# Patient Record
Sex: MALE | Race: WHITE | NOT HISPANIC OR LATINO | Employment: FULL TIME | URBAN - METROPOLITAN AREA
[De-identification: names, ages, dates, MRNs, and addresses within clinical notes are randomized per-mention and may not be internally consistent; named-entity substitution may affect disease eponyms.]

---

## 2017-01-09 ENCOUNTER — PATIENT MESSAGE (OUTPATIENT)
Dept: FAMILY MEDICINE | Facility: CLINIC | Age: 57
End: 2017-01-09

## 2017-01-09 DIAGNOSIS — S89.90XA INJURY OF CALF: Primary | ICD-10-CM

## 2017-01-10 NOTE — TELEPHONE ENCOUNTER
I entered the referral to physical therapy because the message was sent to me.     However please remember that if the patient is requesting physical therapy on his/her own without requesting to see me for the injury or without asking for my diagnosis, they simply can contact any physical therapy of their choice and selft refere. That is a new [place] law that went into effect last year.     If the patient has a problem and we recommend therapy or we have to diagnose it first then I'll put in a referral but the patient this time called herself and requested therapy. She could've simply called them to schedule this.

## 2017-01-20 ENCOUNTER — PATIENT OUTREACH (OUTPATIENT)
Dept: ADMINISTRATIVE | Facility: HOSPITAL | Age: 57
End: 2017-01-20

## 2017-01-27 ENCOUNTER — PATIENT MESSAGE (OUTPATIENT)
Dept: ADMINISTRATIVE | Facility: HOSPITAL | Age: 57
End: 2017-01-27

## 2017-02-03 ENCOUNTER — LAB VISIT (OUTPATIENT)
Dept: LAB | Facility: HOSPITAL | Age: 57
End: 2017-02-03
Attending: FAMILY MEDICINE
Payer: OTHER GOVERNMENT

## 2017-02-03 ENCOUNTER — OFFICE VISIT (OUTPATIENT)
Dept: FAMILY MEDICINE | Facility: CLINIC | Age: 57
End: 2017-02-03
Payer: OTHER GOVERNMENT

## 2017-02-03 VITALS
WEIGHT: 200.63 LBS | HEIGHT: 71 IN | BODY MASS INDEX: 28.09 KG/M2 | RESPIRATION RATE: 14 BRPM | HEART RATE: 67 BPM | DIASTOLIC BLOOD PRESSURE: 80 MMHG | SYSTOLIC BLOOD PRESSURE: 130 MMHG

## 2017-02-03 DIAGNOSIS — M79.661 RIGHT CALF PAIN: ICD-10-CM

## 2017-02-03 DIAGNOSIS — S86.001S ACHILLES TENDON INJURY, RIGHT, SEQUELA: Primary | ICD-10-CM

## 2017-02-03 DIAGNOSIS — Z11.59 NEED FOR HEPATITIS C SCREENING TEST: ICD-10-CM

## 2017-02-03 DIAGNOSIS — D48.5 NEOPLASM OF UNCERTAIN BEHAVIOR OF SKIN: ICD-10-CM

## 2017-02-03 DIAGNOSIS — L72.3 SEBACEOUS CYST: ICD-10-CM

## 2017-02-03 PROCEDURE — 36415 COLL VENOUS BLD VENIPUNCTURE: CPT | Mod: PO

## 2017-02-03 PROCEDURE — 99214 OFFICE O/P EST MOD 30 MIN: CPT | Mod: S$PBB,,, | Performed by: FAMILY MEDICINE

## 2017-02-03 PROCEDURE — 99999 PR PBB SHADOW E&M-EST. PATIENT-LVL IV: CPT | Mod: PBBFAC,,, | Performed by: FAMILY MEDICINE

## 2017-02-03 PROCEDURE — 86803 HEPATITIS C AB TEST: CPT

## 2017-02-03 PROCEDURE — 99214 OFFICE O/P EST MOD 30 MIN: CPT | Mod: PBBFAC,PO | Performed by: FAMILY MEDICINE

## 2017-02-03 NOTE — PROGRESS NOTES
Subjective:     THIS DOCUMENT WAS MADE IN PART WITH [employer] DICTATION SOFTWARE.  OCCASIONALLY THIS SOFTWARE WILL MISINTERPRET WORDS OR PHRASES.     Patient ID: Dell Motley is a 56 y.o. male.    Chief Complaint: Muscle Pain (right calf ); Gastroesophageal Reflux; and Mole (and cyst )    HPI     Request derm consult, several lesions arms, raised, hard    Right calf pain x 2-3 months  Was in PT for partial achilles tear, now lower calf pain.  Seems worse after PT/dry needling  [facility]    Elevated BP, white coat.at home he remains satisfactory below 140/90    Occasional reflux, diet dependent.  He said he mentioned this because of his wife's concern.  Not more than once a week.  Prevented by taking Pepcid.    Sebaceous cyst on back, drained years ago but has returned, and did drain at one point but is not no now, no redness or fever    Active Ambulatory Problems     Diagnosis Date Noted    History of colon polyps 05/15/2015     Resolved Ambulatory Problems     Diagnosis Date Noted    No Resolved Ambulatory Problems     Past Medical History   Diagnosis Date    Hypertension     SNHL (sensorineural hearing loss)          Review of Systems   Constitutional: Negative for activity change and unexpected weight change.   HENT: Negative for hearing loss, rhinorrhea and trouble swallowing.    Eyes: Negative for discharge and visual disturbance.   Respiratory: Negative for chest tightness and wheezing.    Cardiovascular: Negative for chest pain and palpitations.   Gastrointestinal: Negative for blood in stool, constipation, diarrhea and vomiting.   Endocrine: Negative for polydipsia and polyuria.   Genitourinary: Negative for difficulty urinating, hematuria and urgency.   Musculoskeletal: Negative for arthralgias and joint swelling.   Neurological: Negative for weakness and headaches.   Psychiatric/Behavioral: Negative for confusion and dysphoric mood.       Objective:      Physical Exam    Constitutional: He is oriented to person, place, and time. He appears well-developed and well-nourished. No distress.   HENT:   Head: Normocephalic and atraumatic.   Eyes: No scleral icterus.   Neck: Normal range of motion. Neck supple.   Cardiovascular: Normal rate, regular rhythm and normal heart sounds.  Exam reveals no gallop.    No murmur heard.  Pulmonary/Chest: Effort normal. No respiratory distress.   Musculoskeletal:   Examination of his right calf reveals no obvious asymmetry compared to the left.  The Achilles appears intact.  Normal strength with plantar flexion of the foot.  Some mild tenderness along the lower calf region and Achilles region.  Negative Homans   Neurological: He is alert and oriented to person, place, and time.   Skin: Skin is warm and dry. He is not diaphoretic.        Psychiatric: He has a normal mood and affect. His behavior is normal.   Vitals reviewed.      Assessment:       1. Achilles tendon injury, right, sequela    2. Right calf pain    3. Neoplasm of uncertain behavior of skin    4. Sebaceous cyst    5. Need for hepatitis C screening test        Plan:       Dell was seen today for muscle pain, gastroesophageal reflux and mole.    Diagnoses and all orders for this visit:    Achilles tendon injury, right, sequela  -     Ambulatory referral to Orthopedics    Right calf pain  -     Ambulatory referral to Orthopedics  -     US Lower Extremity Veins Right; Future  I suspect this is musculoskeletal  With persistent symptoms as a precaution I agree with ordering an ultrasound    Neoplasm of uncertain behavior of skin  Derm consult    Sebaceous cyst  -     Ambulatory referral to Dermatology    Need for hepatitis C screening test  -     Hepatitis C antibody; Future

## 2017-02-06 LAB — HCV AB SERPL QL IA: NEGATIVE

## 2017-02-20 ENCOUNTER — PATIENT MESSAGE (OUTPATIENT)
Dept: FAMILY MEDICINE | Facility: CLINIC | Age: 57
End: 2017-02-20

## 2017-02-22 ENCOUNTER — TELEPHONE (OUTPATIENT)
Dept: FAMILY MEDICINE | Facility: CLINIC | Age: 57
End: 2017-02-22

## 2017-02-22 NOTE — TELEPHONE ENCOUNTER
Spoke to pt. Pt requesting status of referral. Advised pt that it was still pending and that I would ask the referral team to work on it. Pt verbalized understanding.

## 2017-02-22 NOTE — TELEPHONE ENCOUNTER
----- Message from Radha Lujan sent at 2/22/2017  9:41 AM CST -----  Contact: self   Patient need referral sent to [facility]

## 2017-03-03 ENCOUNTER — PATIENT MESSAGE (OUTPATIENT)
Dept: FAMILY MEDICINE | Facility: CLINIC | Age: 57
End: 2017-03-03

## 2017-03-08 ENCOUNTER — HOSPITAL ENCOUNTER (OUTPATIENT)
Dept: RADIOLOGY | Facility: HOSPITAL | Age: 57
Discharge: HOME | End: 2017-03-08
Attending: FAMILY MEDICINE
Payer: OTHER GOVERNMENT

## 2017-03-08 DIAGNOSIS — M79.661 RIGHT CALF PAIN: ICD-10-CM

## 2017-03-08 PROCEDURE — 93971 EXTREMITY STUDY: CPT | Mod: TC,PO

## 2017-03-08 PROCEDURE — 93971 EXTREMITY STUDY: CPT | Mod: 26,,, | Performed by: RADIOLOGY

## 2017-05-02 ENCOUNTER — INITIAL CONSULT (OUTPATIENT)
Dept: DERMATOLOGY | Facility: CLINIC | Age: 57
End: 2017-05-02
Payer: OTHER GOVERNMENT

## 2017-05-02 VITALS — HEIGHT: 71 IN | BODY MASS INDEX: 28 KG/M2 | WEIGHT: 200 LBS | RESPIRATION RATE: 16 BRPM

## 2017-05-02 DIAGNOSIS — L57.0 MULTIPLE ACTINIC KERATOSES: Primary | ICD-10-CM

## 2017-05-02 DIAGNOSIS — L72.9 CYST OF SKIN: ICD-10-CM

## 2017-05-02 DIAGNOSIS — L82.1 SEBORRHEIC KERATOSES: ICD-10-CM

## 2017-05-02 DIAGNOSIS — Z12.83 SKIN CANCER SCREENING: ICD-10-CM

## 2017-05-02 DIAGNOSIS — B07.9 VIRAL WARTS, UNSPECIFIED TYPE: ICD-10-CM

## 2017-05-02 DIAGNOSIS — L81.4 LENTIGINES: ICD-10-CM

## 2017-05-02 PROCEDURE — 99999 PR PBB SHADOW E&M-EST. PATIENT-LVL II: CPT | Mod: PBBFAC,,, | Performed by: DERMATOLOGY

## 2017-05-02 PROCEDURE — 99203 OFFICE O/P NEW LOW 30 MIN: CPT | Mod: 25,S$PBB,, | Performed by: DERMATOLOGY

## 2017-05-02 PROCEDURE — 17110 DESTRUCTION B9 LES UP TO 14: CPT | Mod: PBBFAC,PO | Performed by: DERMATOLOGY

## 2017-05-02 PROCEDURE — 99212 OFFICE O/P EST SF 10 MIN: CPT | Mod: PBBFAC,25,PO | Performed by: DERMATOLOGY

## 2017-05-02 PROCEDURE — 17110 DESTRUCTION B9 LES UP TO 14: CPT | Mod: S$PBB,,, | Performed by: DERMATOLOGY

## 2017-05-02 NOTE — PROGRESS NOTES
Subjective:       Patient ID:  Dell Motley is a 56 y.o. male who presents for   Chief Complaint   Patient presents with    Skin Check    Cyst     HPI Comments: Initial visit with dermatologist for skin check   Bump on R upper back - approx 9 yrs - removed in 2012 & has returned - sebaceous cyst  Lesions - arms  dark in color - months - never treated  Lesion L arm  - yrs - raised, dry & scaly - cryo'd in past by Dr Clemente     No phx skin ca   No known fhx skin ca    Cyst       Growth on left leg, years, desires removal . History cryo in the past.     Review of Systems   Skin: Positive for dry skin, activity-related sunscreen use and wears hat. Negative for itching, daily sunscreen use, sensitivity to antibiotic ointment, sensitivity to bandage adhesive and tendency to form keloidal scars.   Hematologic/Lymphatic: Does not bruise/bleed easily.        Objective:    Physical Exam   Constitutional: He appears well-developed and well-nourished. No distress.   Eyes: Lids are normal.  No conjunctival no injection.   Cardiovascular: There is no local extremity swelling and no dependent edema.     Neurological: He is alert and oriented to person, place, and time. He is not disoriented.   Psychiatric: He has a normal mood and affect.   Skin:   Areas Examined (abnormalities noted in diagram):   Scalp / Hair Palpated and Inspected  Head / Face Inspection Performed  Neck Inspection Performed  Chest / Axilla Inspection Performed  Abdomen Inspection Performed  Back Inspection Performed  RUE Inspected  LUE Inspection Performed  RLE Inspected  LLE Inspection Performed                   Diagram Legend     Erythematous scaling macule/papule c/w actinic keratosis       Vascular papule c/w angioma      Pigmented verrucoid papule/plaque c/w seborrheic keratosis      Yellow umbilicated papule c/w sebaceous hyperplasia      Irregularly shaped tan macule c/w lentigo     1-2 mm smooth white papules consistent with Milia       Movable subcutaneous cyst with punctum c/w epidermal inclusion cyst      Subcutaneous movable cyst c/w pilar cyst      Firm pink to brown papule c/w dermatofibroma      Pedunculated fleshy papule(s) c/w skin tag(s)      Evenly pigmented macule c/w junctional nevus     Mildly variegated pigmented, slightly irregular-bordered macule c/w mildly atypical nevus      Flesh colored to evenly pigmented papule c/w intradermal nevus       Pink pearly papule/plaque c/w basal cell carcinoma      Erythematous hyperkeratotic cursted plaque c/w SCC      Surgical scar with no sign of skin cancer recurrence      Open and closed comedones      Inflammatory papules and pustules      Verrucoid papule consistent consistent with wart     Erythematous eczematous patches and plaques     Dystrophic onycholytic nail with subungual debris c/w onychomycosis     Umbilicated papule    Erythematous-base heme-crusted tan verrucoid plaque consistent with inflamed seborrheic keratosis     Erythematous Silvery Scaling Plaque c/w Psoriasis     See annotation      Assessment / Plan:        Multiple actinic keratoses  Declines cryo today due to upcoming meeting and hunting trip  Will cryo at time of cyst excision.     Cyst of skin, back  Will schedule excision in near future    Skin cancer screening  Upper body skin examination performed today including at least 6 points as noted in physical examination. No lesions suspicious for malignancy noted.        Seborrheic keratoses, trunk and extremities  These are benign inherited growths without a malignant potential. Reassurance given to patient. No treatment is necessary.       Lentigines, trunk  This is a benign hyperpigmented sun induced lesion. Daily sun protection will reduce the number of new lesions. Treatment of these benign lesions are considered cosmetic.    Verruca vulgaris  Cryosurgery procedure note:    Verbal consent from the patient is obtained. Liquid nitrogen cryosurgery is applied to 1  lesions to produce a freeze injury. The patient is aware that blisters may form and is instructed on wound care with gentle cleansing and use of vaseline ointment to keep moist until healed. The patient is supplied a handout on cryosurgery and is instructed to call if lesions do not completely resolve. Risk of dyspigmentation discussed.                Return for cyst excision.

## 2017-05-11 ENCOUNTER — PROCEDURE VISIT (OUTPATIENT)
Dept: DERMATOLOGY | Facility: CLINIC | Age: 57
End: 2017-05-11
Payer: OTHER GOVERNMENT

## 2017-05-11 VITALS — BODY MASS INDEX: 27.86 KG/M2 | WEIGHT: 199 LBS | HEIGHT: 71 IN

## 2017-05-11 DIAGNOSIS — L72.9 CYST OF SKIN: Primary | ICD-10-CM

## 2017-05-11 DIAGNOSIS — L57.0 ACTINIC KERATOSES: ICD-10-CM

## 2017-05-11 DIAGNOSIS — Z98.890 S/P SKIN AND SUBCUTANEOUS TISSUE SURGERY: ICD-10-CM

## 2017-05-11 PROCEDURE — 12031 INTMD RPR S/A/T/EXT 2.5 CM/<: CPT | Mod: 78,S$PBB,, | Performed by: DERMATOLOGY

## 2017-05-11 PROCEDURE — 11402 EXC TR-EXT B9+MARG 1.1-2 CM: CPT | Mod: 78,51,S$PBB, | Performed by: DERMATOLOGY

## 2017-05-11 PROCEDURE — 11402 EXC TR-EXT B9+MARG 1.1-2 CM: CPT | Mod: PBBFAC,PO | Performed by: DERMATOLOGY

## 2017-05-11 PROCEDURE — 88304 TISSUE EXAM BY PATHOLOGIST: CPT | Performed by: PATHOLOGY

## 2017-05-11 PROCEDURE — 12031 INTMD RPR S/A/T/EXT 2.5 CM/<: CPT | Mod: PBBFAC,PO | Performed by: DERMATOLOGY

## 2017-05-11 PROCEDURE — 99499 UNLISTED E&M SERVICE: CPT | Mod: S$PBB,,, | Performed by: DERMATOLOGY

## 2017-05-11 RX ORDER — DOXYCYCLINE 100 MG/1
CAPSULE ORAL
Qty: 20 CAPSULE | Refills: 0 | Status: SHIPPED | OUTPATIENT
Start: 2017-05-11 | End: 2017-06-27 | Stop reason: CLARIF

## 2017-05-11 NOTE — PROGRESS NOTES
Subjective:       Patient ID:  Dell Motley is a 56 y.o. male who presents for No chief complaint on file.    HPI     Pt here for excision of cyst on right upper back    Also with Aks on face, declined cryo at last visit and would like done today.     Last time cyst was excised pt developed a post op infection.     Review of Systems   Skin: Negative for tendency to form keloidal scars.   Hematologic/Lymphatic: Does not bruise/bleed easily.        Objective:    Physical Exam   Constitutional: He appears well-developed and well-nourished. No distress.   HENT:   Head:       Neurological: He is alert and oriented to person, place, and time. He is not disoriented.   Psychiatric: He has a normal mood and affect.   Skin:   Areas Examined (abnormalities noted in diagram):   Head / Face Inspection Performed  Neck Inspection Performed  Chest / Axilla Inspection Performed  Back Inspection Performed  RUE Inspected  LUE Inspection Performed              Diagram Legend     Erythematous scaling macule/papule c/w actinic keratosis       Vascular papule c/w angioma      Pigmented verrucoid papule/plaque c/w seborrheic keratosis      Yellow umbilicated papule c/w sebaceous hyperplasia      Irregularly shaped tan macule c/w lentigo     1-2 mm smooth white papules consistent with Milia      Movable subcutaneous cyst with punctum c/w epidermal inclusion cyst      Subcutaneous movable cyst c/w pilar cyst      Firm pink to brown papule c/w dermatofibroma      Pedunculated fleshy papule(s) c/w skin tag(s)      Evenly pigmented macule c/w junctional nevus     Mildly variegated pigmented, slightly irregular-bordered macule c/w mildly atypical nevus      Flesh colored to evenly pigmented papule c/w intradermal nevus       Pink pearly papule/plaque c/w basal cell carcinoma      Erythematous hyperkeratotic cursted plaque c/w SCC      Surgical scar with no sign of skin cancer recurrence      Open and closed comedones      Inflammatory  papules and pustules      Verrucoid papule consistent consistent with wart     Erythematous eczematous patches and plaques     Dystrophic onycholytic nail with subungual debris c/w onychomycosis     Umbilicated papule    Erythematous-base heme-crusted tan verrucoid plaque consistent with inflamed seborrheic keratosis     Erythematous Silvery Scaling Plaque c/w Psoriasis     See annotation      Assessment / Plan:      Pathology Orders:      Normal Orders This Visit    Tissue Specimen To Pathology, Dermatology     Questions:    Directional Terms:  Other(comment)    Clinical information:  cyst    Specific Site:  right upper back        Cyst of skin  -     Tissue Specimen To Pathology, Dermatology    PROCEDURE: Elliptical excision with intermediate layered repair in order to decrease dead space and decrease tension.    ANESTHETIC: 12 cc 1% Xylocaine with Epinephrine 1:100,000, buffered    SURGEON: France Ford MD      ASSISTANTS: Yamile Denny MA      PREOPERATIVE DIAGNOSIS:  EIC    POSTOPERATIVE DIAGNOSIS:  Same as preoperative diagnosis    PATHOLOGIC DIAGNOSIS: Pending    LOCATION: right upper back    INITIAL LESION SIZE: 2 cm    EXCISED DIAMETER: 2 cm    PREPARATION: The diagnosis, procedure, alternatives, benefits and risks, including but not limited to: infection, bleeding/bruising, drug reactions, pain, scar or cosmetic defect, local sensation disturbances, wound dehiscence (separation of wound edges after sutures removed) and/or recurrence of present condition were explained to the patient. The patient elected to proceed.  Patient's identity was verified using 2 patient identifiers and the side and site was verified.  Time out period with surgeon, assistant and patient in surgical suite was taken.    PROCEDURE: The location noted above was prepped and draped by Yamile Denny MA in the usual sterile fashion. The area was anesthetized by myself. Lesional tissue was carefully marked and a fusiform  elliptical excision was done with #15 blade carried down completely through the dermis into the deep subcutaneous tissues to the level of the non-muscle fascia, and dissection was carried out in that plane.  Electrocoagulation was used to obtain hemostasis. Blood loss was minimal. The wound was then approximated in a layered fashion with subcutaneous and intradermal sutures of 4.0 Monocryl, approximately 3 in number, and the wound was then superficially closed with simple interrupted sutures of 4.0 Nylon.     The patient tolerated the procedure well.    The area was cleaned and dressed appropriately and the patient was given wound care instructions, as well as an appointment for follow-up evaluation.    LENGTH OF REPAIR: 2.3 cm        Actinic keratoses  Cryosurgery Procedure Note    Verbal consent from the patient is obtained and the patient is aware of the precancerous quality and need for treatment of these lesions. Liquid nitrogen cryosurgery is applied to the 3 actinic keratoses, as detailed in the physical exam, to produce a freeze injury. The patient is aware that blisters may form and is instructed on wound care with gentle cleansing and use of vaseline ointment to keep moist until healed. The patient is supplied a handout on cryosurgery and is instructed to call if lesions do not completely resolve. Discussed risk postinflammatory pigmentary changes.       S/P skin and subcutaneous tissue surgery  History skin infection following surgery  Informed patient I will be out of town next week. Rx Abx given to patient today. Does not need to take unless develops warmth, drainage, redness. If not improved with doxy rec go to ED or urgent care  -     doxycycline (MONODOX) 100 MG capsule; 1 po bid with food  Dispense: 20 capsule; Refill: 0             Return in about 2 weeks (around 5/25/2017) for nurse visit for suture removal.

## 2017-05-25 ENCOUNTER — CLINICAL SUPPORT (OUTPATIENT)
Dept: DERMATOLOGY | Facility: CLINIC | Age: 57
End: 2017-05-25
Payer: OTHER GOVERNMENT

## 2017-05-25 ENCOUNTER — OFFICE VISIT (OUTPATIENT)
Dept: DERMATOLOGY | Facility: CLINIC | Age: 57
End: 2017-05-25
Payer: OTHER GOVERNMENT

## 2017-05-25 VITALS — BODY MASS INDEX: 27.86 KG/M2 | HEIGHT: 71 IN | WEIGHT: 199 LBS

## 2017-05-25 DIAGNOSIS — L91.8 INFLAMED SKIN TAG: Primary | ICD-10-CM

## 2017-05-25 DIAGNOSIS — Z87.2 HISTORY OF ACTINIC KERATOSES: ICD-10-CM

## 2017-05-25 PROCEDURE — 99999 PR PBB SHADOW E&M-EST. PATIENT-LVL II: CPT | Mod: PBBFAC,,, | Performed by: DERMATOLOGY

## 2017-05-25 PROCEDURE — 11200 RMVL SKIN TAGS UP TO&INC 15: CPT | Mod: PBBFAC,PO | Performed by: DERMATOLOGY

## 2017-05-25 PROCEDURE — 99211 OFF/OP EST MAY X REQ PHY/QHP: CPT | Mod: PBBFAC,PO,25

## 2017-05-25 PROCEDURE — 99999 PR PBB SHADOW E&M-EST. PATIENT-LVL I: CPT | Mod: PBBFAC,,,

## 2017-05-25 PROCEDURE — 11200 RMVL SKIN TAGS UP TO&INC 15: CPT | Mod: S$PBB,,, | Performed by: DERMATOLOGY

## 2017-05-25 PROCEDURE — 99212 OFFICE O/P EST SF 10 MIN: CPT | Mod: PBBFAC,27,PO,25 | Performed by: DERMATOLOGY

## 2017-05-25 PROCEDURE — 99212 OFFICE O/P EST SF 10 MIN: CPT | Mod: 25,S$PBB,, | Performed by: DERMATOLOGY

## 2017-05-25 NOTE — PROGRESS NOTES
Subjective:       Patient ID:  Dell Motley is a 56 y.o. male who presents for   Chief Complaint   Patient presents with    Lesion     Pt here complains of lesion right axilla for many years, recently became irritated after hitting it with deodorant, not treated  No phx skin ca   No known fhx skin ca  Cyst recently excised from upper back- no post op complaints  AKs s/p cryo at procedure appt, resolved.                     Review of Systems   Skin: Positive for activity-related sunscreen use. Negative for rash and recent sunburn.   Hematologic/Lymphatic: Does not bruise/bleed easily.        Objective:    Physical Exam   Constitutional: He appears well-developed and well-nourished. No distress.   HENT:   Mouth/Throat: Lips normal.    Eyes: Lids are normal.  No conjunctival no injection.   Neurological: He is alert and oriented to person, place, and time. He is not disoriented.   Psychiatric: He has a normal mood and affect.   Skin:   Areas Examined (abnormalities noted in diagram):   Head / Face Inspection Performed  Neck Inspection Performed  Chest / Axilla Inspection Performed  Back Inspection Performed  RUE Inspected  LUE Inspection Performed                   Diagram Legend     Erythematous scaling macule/papule c/w actinic keratosis       Vascular papule c/w angioma      Pigmented verrucoid papule/plaque c/w seborrheic keratosis      Yellow umbilicated papule c/w sebaceous hyperplasia      Irregularly shaped tan macule c/w lentigo     1-2 mm smooth white papules consistent with Milia      Movable subcutaneous cyst with punctum c/w epidermal inclusion cyst      Subcutaneous movable cyst c/w pilar cyst      Firm pink to brown papule c/w dermatofibroma      Pedunculated fleshy papule(s) c/w skin tag(s)      Evenly pigmented macule c/w junctional nevus     Mildly variegated pigmented, slightly irregular-bordered macule c/w mildly atypical nevus      Flesh colored to evenly pigmented papule c/w intradermal nevus        Pink pearly papule/plaque c/w basal cell carcinoma      Erythematous hyperkeratotic cursted plaque c/w SCC      Surgical scar with no sign of skin cancer recurrence      Open and closed comedones      Inflammatory papules and pustules      Verrucoid papule consistent consistent with wart     Erythematous eczematous patches and plaques     Dystrophic onycholytic nail with subungual debris c/w onychomycosis     Umbilicated papule    Erythematous-base heme-crusted tan verrucoid plaque consistent with inflamed seborrheic keratosis     Erythematous Silvery Scaling Plaque c/w Psoriasis     See annotation      Assessment / Plan:        Inflamed skin tag, right axilla  Verbal consent obtained. 1 lesions removed with scissor snip removal after anesthesia with 1% lidocaine with epinephrine. Hemostasis achieved with aluminum chloride and hyfrecation. No complications.      History of actinic keratoses, nose  Resolved s/p cryo  Reassurance              Return in about 6 months (around 11/25/2017).

## 2017-05-25 NOTE — PROGRESS NOTES
Sutures removed from upper back  No sign of infection   Healing wound intact  Sutures removed without difficulty   No pt concerns.  FINAL PATHOLOGIC DIAGNOSIS   1. Skin, right upper back, excision:   - FOLLICULAR CYST, INFUNDIBULAR TYPE.   MICROSCOPIC DESCRIPTION: Sections show a cyst lined by keratinizing squamous epithelium showing a visible   granular layer and filled with loose laminated keratin.   Diagnosed by: Arianna Augustine M.D.   (Electronically Signed: 2017-05-17 16:30:03)   Gross Description   AP/ID [id number]   Received in formalin, labeled RT upper back, is a 1.2 x 0.7 centimeter ellipse of non-pigmented skin with attached   1.7 centimeter rupture cyst. The cyst wall is smooth and white. The skin surface is unremarkable. No orientation   is provided. The rest of the cut surfaces are unremarkable. A representative section is submitted, [id number].   Sue Mcleod   Page

## 2017-06-20 ENCOUNTER — PATIENT MESSAGE (OUTPATIENT)
Dept: FAMILY MEDICINE | Facility: CLINIC | Age: 57
End: 2017-06-20

## 2017-06-20 DIAGNOSIS — I25.10 CORONARY ARTERY DISEASE, ANGINA PRESENCE UNSPECIFIED, UNSPECIFIED VESSEL OR LESION TYPE, UNSPECIFIED WHETHER NATIVE OR TRANSPLANTED HEART: Primary | ICD-10-CM

## 2017-06-20 NOTE — TELEPHONE ENCOUNTER
I responded to his mitral message.  I entered a referral to cardiology.  Please help arrange this follow-up I suspect within the next few weeks.  If his discharge instructions require him to be seen sooner than please discuss that with him.    At this time I'm refraining from sending any prescriptions or refills, will need to see what his discharge medications are and then I'll refill as needed.

## 2017-06-22 NOTE — TELEPHONE ENCOUNTER
Spoke with patient and he stated that he would like to research the cardiologist here at [facility] and he will call back to schedule appointment.

## 2017-06-23 ENCOUNTER — TELEPHONE (OUTPATIENT)
Dept: CARDIOLOGY | Facility: CLINIC | Age: 57
End: 2017-06-23

## 2017-06-23 NOTE — TELEPHONE ENCOUNTER
Spoke with patient and he does not want to wait to see Dr Morris in August. Had a heart attack while at son's house in [place], [place] and needs post angiogram/stent follow up and establishment of care. Opted to see Dr Piedra on Tues. appt set.

## 2017-06-23 NOTE — TELEPHONE ENCOUNTER
----- Message from Jackelyn Sánchez sent at 6/23/2017  7:14 AM CDT -----  Please call pt at [phone]  If any sooner appointments become available ... Has an august appointment / on the wait list ..requested to have his paperwork faxed

## 2017-06-27 ENCOUNTER — OFFICE VISIT (OUTPATIENT)
Dept: CARDIOLOGY | Facility: CLINIC | Age: 57
End: 2017-06-27
Payer: OTHER GOVERNMENT

## 2017-06-27 VITALS
SYSTOLIC BLOOD PRESSURE: 141 MMHG | WEIGHT: 190.25 LBS | DIASTOLIC BLOOD PRESSURE: 84 MMHG | HEIGHT: 71 IN | HEART RATE: 72 BPM | BODY MASS INDEX: 26.64 KG/M2

## 2017-06-27 DIAGNOSIS — I25.10 CORONARY ARTERY DISEASE INVOLVING NATIVE CORONARY ARTERY OF NATIVE HEART WITHOUT ANGINA PECTORIS: ICD-10-CM

## 2017-06-27 DIAGNOSIS — E78.5 DYSLIPIDEMIA: ICD-10-CM

## 2017-06-27 DIAGNOSIS — Z95.5 S/P CORONARY ARTERY STENT PLACEMENT: ICD-10-CM

## 2017-06-27 DIAGNOSIS — Z82.49 FAMILY HISTORY OF CORONARY ARTERY DISEASE: ICD-10-CM

## 2017-06-27 DIAGNOSIS — I25.10 CORONARY ARTERY DISEASE, ANGINA PRESENCE UNSPECIFIED, UNSPECIFIED VESSEL OR LESION TYPE, UNSPECIFIED WHETHER NATIVE OR TRANSPLANTED HEART: Primary | ICD-10-CM

## 2017-06-27 PROCEDURE — 99204 OFFICE O/P NEW MOD 45 MIN: CPT | Mod: S$PBB,,, | Performed by: INTERNAL MEDICINE

## 2017-06-27 PROCEDURE — 99999 PR PBB SHADOW E&M-EST. PATIENT-LVL III: CPT | Mod: PBBFAC,,, | Performed by: INTERNAL MEDICINE

## 2017-06-27 PROCEDURE — 99213 OFFICE O/P EST LOW 20 MIN: CPT | Mod: PBBFAC,PO | Performed by: INTERNAL MEDICINE

## 2017-06-27 RX ORDER — ATORVASTATIN CALCIUM 80 MG/1
80 TABLET, FILM COATED ORAL DAILY
COMMUNITY
End: 2017-06-28 | Stop reason: SDUPTHER

## 2017-06-27 RX ORDER — LISINOPRIL 2.5 MG/1
2.5 TABLET ORAL DAILY
COMMUNITY
End: 2017-06-28 | Stop reason: SDUPTHER

## 2017-06-27 NOTE — LETTER
June 27, 2017      Forrest Clemente MD  [address] [facility] [address] [place] [address]           [facility]  [address] [facility] [address] [place] [address]  Phone: [phone]          Patient: Dell Motley   MR Number: [id number]   YOB: 1960   Date of Visit: 6/27/2017       Dear Dr. Forrest Clemente:    Thank you for referring Dell Motley to me for evaluation. Attached you will find relevant portions of my assessment and plan of care.    If you have questions, please do not hesitate to call me. I look forward to following Dell Motley along with you.    Sincerely,    Jose Miguel Piedra MD    Enclosure  CC:  No Recipients    If you would like to receive this communication electronically, please contact [email or web address] or [phone] to request more information on [employer] access.    For providers and/or their staff who would like to refer a patient to [facility], please contact us through our one-stop-shop provider referral line, [facility], at [phone].    If you feel you have received this communication in error or would no longer like to receive these types of communications, please e-mail [email or web address]

## 2017-06-27 NOTE — PROGRESS NOTES
Subjective:    Patient ID:  Dell Motley is a 56 y.o. male who presents for evaluation of Coronary Artery Disease (establish care Stents in [place]) and Hyperlipidemia      Pt here to establish care. He had MI with stenting of a totally occluded ramus 06/18 while visiting in [place]. Prior to that he had no symptoms and exercised regularly. He had an elevated CT Ca++ score in 2002 and had unremarkable coronary angiogram in 2003. No post discharge issues or recurrent chest pain.         Review of Systems   Constitution: Negative for weight gain and weight loss.   HENT: Negative.    Eyes: Negative.    Cardiovascular: Negative for chest pain, claudication, cyanosis, dyspnea on exertion, irregular heartbeat, leg swelling, near-syncope, orthopnea (no PND), palpitations and syncope.   Respiratory: Negative for cough, hemoptysis, shortness of breath and snoring.    Endocrine: Negative.    Skin: Negative.    Musculoskeletal: Negative for joint pain, muscle cramps, muscle weakness and myalgias.   Gastrointestinal: Negative for diarrhea, hematemesis, nausea and vomiting.   Genitourinary: Negative.    Neurological: Negative for dizziness, focal weakness, light-headedness, loss of balance, numbness, paresthesias and seizures.   Psychiatric/Behavioral: Negative.         Objective:    Physical Exam   Constitutional: He is oriented to person, place, and time. He appears well-developed and well-nourished.   HENT:   Mouth/Throat: Oropharynx is clear and moist.   Eyes: Pupils are equal, round, and reactive to light.   Neck: Normal range of motion. No thyromegaly present.   Cardiovascular: Normal rate, regular rhythm, S1 normal, S2 normal, normal heart sounds, intact distal pulses and normal pulses.   No extrasystoles are present. PMI is not displaced.  Exam reveals no friction rub.    No murmur heard.  Pulmonary/Chest: Effort normal and breath sounds normal. He has no wheezes. He has no rales. He exhibits no tenderness.    Abdominal: Soft. Bowel sounds are normal. He exhibits no distension and no mass. There is no tenderness.   Musculoskeletal: Normal range of motion. He exhibits no edema.   Neurological: He is alert and oriented to person, place, and time.   Skin: Skin is warm and dry.   Vitals reviewed.      Test(s) Reviewed  I have reviewed the following in detail:  [] Stress test   [x] Angiography   [x] Echocardiogram   [x] Labs   [x] Other:  Reviewed all hospital records       Assessment:       1. Coronary artery disease involving native coronary artery of native heart without angina pectoris    2. S/P coronary artery stent placement - x2 Ramus 06/2017; mild luminal disease other vessels    3. Dyslipidemia    4. Family history of coronary artery disease         Plan:       We discussed his event, risk factors and current medications  All questions answered  Enroll in cardiac rehab  Continue present Rx  F/u 3 months with repeat Echo, Lipids, CMP

## 2017-06-28 ENCOUNTER — OFFICE VISIT (OUTPATIENT)
Dept: FAMILY MEDICINE | Facility: CLINIC | Age: 57
End: 2017-06-28
Payer: OTHER GOVERNMENT

## 2017-06-28 VITALS
WEIGHT: 190.25 LBS | SYSTOLIC BLOOD PRESSURE: 124 MMHG | DIASTOLIC BLOOD PRESSURE: 82 MMHG | BODY MASS INDEX: 26.64 KG/M2 | RESPIRATION RATE: 16 BRPM | HEIGHT: 71 IN | HEART RATE: 64 BPM

## 2017-06-28 DIAGNOSIS — I25.10 CORONARY ARTERY DISEASE, ANGINA PRESENCE UNSPECIFIED, UNSPECIFIED VESSEL OR LESION TYPE, UNSPECIFIED WHETHER NATIVE OR TRANSPLANTED HEART: Primary | ICD-10-CM

## 2017-06-28 DIAGNOSIS — R73.09 ELEVATED GLUCOSE: ICD-10-CM

## 2017-06-28 PROCEDURE — 99999 PR PBB SHADOW E&M-EST. PATIENT-LVL III: CPT | Mod: PBBFAC,,, | Performed by: FAMILY MEDICINE

## 2017-06-28 PROCEDURE — 99214 OFFICE O/P EST MOD 30 MIN: CPT | Mod: S$PBB,,, | Performed by: FAMILY MEDICINE

## 2017-06-28 PROCEDURE — 99213 OFFICE O/P EST LOW 20 MIN: CPT | Mod: PBBFAC,PO | Performed by: FAMILY MEDICINE

## 2017-06-28 RX ORDER — LISINOPRIL 2.5 MG/1
2.5 TABLET ORAL DAILY
Qty: 90 TABLET | Refills: 1 | Status: SHIPPED | OUTPATIENT
Start: 2017-06-28 | End: 2017-07-12 | Stop reason: SDUPTHER

## 2017-06-28 RX ORDER — ATORVASTATIN CALCIUM 80 MG/1
80 TABLET, FILM COATED ORAL DAILY
Qty: 30 TABLET | Refills: 5 | Status: SHIPPED | OUTPATIENT
Start: 2017-06-28 | End: 2017-07-12 | Stop reason: SDUPTHER

## 2017-06-29 NOTE — PROGRESS NOTES
Subjective:     THIS DOCUMENT WAS MADE IN PART WITH [employer] DICTATION SOFTWARE.  OCCASIONALLY THIS SOFTWARE WILL MISINTERPRET WORDS OR PHRASES.     Patient ID: Dell Motley is a 56 y.o. male.    Chief Complaint: Hospital Follow Up (Post MI )    HPI      hospital follow-up. This is a 56-year-old male who recently had an acute MI. He was on vacation with his family, sitting on the couch at the time when he felt some discomfort in his chest. He thought it may have been anxieties or try to walk it off but it didn't improve and was shortly followed by heavy diaphoresis at which point he took an aspirin and had a son drive him to the emergency room. He reports the initial EKG was normal but third set of cardigans arms had revealed after malady and increase in troponin at which point he underwent an angiogram and required two stents. He has been placed on 80 mg of Lipitor. He is on antiplatelet medication as well. There was an area of hypokinesis and a mildly depressed left ventricular ejection fraction estimated 45 to 50%. He has been placed on an ACE inhibitor.     He's feeling well not having angina but is cautious about his activity level and planning on cardiac rehab.     There was also some random elevations in glucose during the hospitalization highest 141.     Practice episode he continues regular exercise strenuous exercise and was having no problems until the sudden event. There is a family history of coronary artery disease in his father but he doesn't get his father has a less than healthy lifestyle.    Active Ambulatory Problems     Diagnosis Date Noted    History of colon polyps 05/15/2015    Coronary artery disease involving native coronary artery of native heart without angina pectoris 06/27/2017    S/P coronary artery stent placement - x2 Ramus 06/2017; mild luminal disease other vessels 06/27/2017    Dyslipidemia 06/27/2017    Family history of coronary artery disease 06/27/2017     Resolved  Ambulatory Problems     Diagnosis Date Noted    No Resolved Ambulatory Problems     Past Medical History:   Diagnosis Date    Hypertension     SNHL (sensorineural hearing loss)      Current Outpatient Prescriptions on File Prior to Visit   Medication Sig Dispense Refill    aspirin (ECOTRIN) 81 MG EC tablet Take 81 mg by mouth once daily.        fish oil-omega-3 fatty acids 300-1,000 mg capsule Take 2 g by mouth once daily.      multivitamin (THERAGRAN) per tablet Take 1 tablet by mouth once daily.      ticagrelor (BRILINTA) 90 mg tablet Take 90 mg by mouth 2 (two) times daily.      [DISCONTINUED] atorvastatin (LIPITOR) 80 MG tablet Take 80 mg by mouth once daily.      [DISCONTINUED] lisinopril (PRINIVIL,ZESTRIL) 2.5 MG tablet Take 2.5 mg by mouth once daily.       No current facility-administered medications on file prior to visit.      Review of patient's allergies indicates:   Allergen Reactions    Codeine Nausea And Vomiting     Social History   Substance Use Topics    Smoking status: Never Smoker    Smokeless tobacco: Former User      Comment: Quit 20 years ago.    Alcohol use 3.0 oz/week     5 Glasses of wine per week      Comment: on weekends     Family History   Problem Relation Age of Onset    Lung cancer Father     Heart disease       Remote in second degree relatives         Review of Systems   Constitutional: Positive for activity change. Negative for unexpected weight change.   HENT: Negative for hearing loss, rhinorrhea and trouble swallowing.    Eyes: Negative for discharge and visual disturbance.   Respiratory: Negative for chest tightness and wheezing.    Cardiovascular: Negative for chest pain and palpitations.   Gastrointestinal: Negative for blood in stool, constipation, diarrhea and vomiting.   Endocrine: Negative for polydipsia and polyuria.   Genitourinary: Negative for difficulty urinating, hematuria and urgency.   Musculoskeletal: Negative for arthralgias, joint swelling and  neck pain.   Neurological: Negative for weakness and headaches.   Psychiatric/Behavioral: Negative for confusion and dysphoric mood.       Objective:      Physical Exam   Constitutional: He is oriented to person, place, and time. He appears well-developed and well-nourished. No distress.   HENT:   Head: Normocephalic and atraumatic.   Eyes: No scleral icterus.   Neck: Normal range of motion. Neck supple.   Cardiovascular: Normal rate, regular rhythm and normal heart sounds.  Exam reveals no gallop.    No murmur heard.  Pulmonary/Chest: Effort normal. No respiratory distress.   Neurological: He is alert and oriented to person, place, and time. He has normal reflexes.   Skin: Skin is warm and dry. He is not diaphoretic.   Psychiatric: He has a normal mood and affect. His behavior is normal.   Vitals reviewed.      Assessment:       1. Coronary artery disease, angina presence unspecified, unspecified vessel or lesion type, unspecified whether native or transplanted heart    2. Elevated glucose        Plan:       Dell was seen today for hospital follow up.    Diagnoses and all orders for this visit:    Coronary artery disease, angina presence unspecified, unspecified vessel or lesion type, unspecified whether native or transplanted heart  -     atorvastatin (LIPITOR) 80 MG tablet; Take 1 tablet (80 mg total) by mouth once daily.  -     Lipid panel; Future  -     Comprehensive metabolic panel; Future    Elevated glucose  -     Hemoglobin A1c; Future   Possibly direct response to his acute coronary event, will repeat a fasting glucose and an A1c in about four weeks as a precaution.    Other orders  -     lisinopril (PRINIVIL,ZESTRIL) 2.5 MG tablet; Take 1 tablet (2.5 mg total) by mouth once daily.     I did review hospital records including cardiac studies, cath eport, chemistries and glucose level. I did review his consultation with cardiology from earlier in the week.

## 2017-06-30 ENCOUNTER — PATIENT MESSAGE (OUTPATIENT)
Dept: FAMILY MEDICINE | Facility: CLINIC | Age: 57
End: 2017-06-30

## 2017-07-02 ENCOUNTER — PATIENT MESSAGE (OUTPATIENT)
Dept: CARDIOLOGY | Facility: CLINIC | Age: 57
End: 2017-07-02

## 2017-07-08 ENCOUNTER — PATIENT MESSAGE (OUTPATIENT)
Dept: FAMILY MEDICINE | Facility: CLINIC | Age: 57
End: 2017-07-08

## 2017-07-11 ENCOUNTER — PATIENT MESSAGE (OUTPATIENT)
Dept: FAMILY MEDICINE | Facility: CLINIC | Age: 57
End: 2017-07-11

## 2017-07-11 ENCOUNTER — PATIENT MESSAGE (OUTPATIENT)
Dept: CARDIOLOGY | Facility: CLINIC | Age: 57
End: 2017-07-11

## 2017-07-11 DIAGNOSIS — I25.10 CORONARY ARTERY DISEASE, ANGINA PRESENCE UNSPECIFIED, UNSPECIFIED VESSEL OR LESION TYPE, UNSPECIFIED WHETHER NATIVE OR TRANSPLANTED HEART: ICD-10-CM

## 2017-07-11 NOTE — TELEPHONE ENCOUNTER
----- Message from Karina Randolph sent at 7/11/2017 11:13 AM CDT -----  Contact: [phone]  Patient needs a refill on rx brilinta 90 mg for 90 day supply with three refills. Thanks!

## 2017-07-12 RX ORDER — ATORVASTATIN CALCIUM 80 MG/1
80 TABLET, FILM COATED ORAL DAILY
Qty: 90 TABLET | Refills: 1 | Status: SHIPPED | OUTPATIENT
Start: 2017-07-12 | End: 2017-11-21 | Stop reason: SDUPTHER

## 2017-07-12 RX ORDER — LISINOPRIL 2.5 MG/1
2.5 TABLET ORAL DAILY
Qty: 90 TABLET | Refills: 1 | Status: SHIPPED | OUTPATIENT
Start: 2017-07-12 | End: 2017-11-21 | Stop reason: SDUPTHER

## 2017-07-16 ENCOUNTER — PATIENT MESSAGE (OUTPATIENT)
Dept: FAMILY MEDICINE | Facility: CLINIC | Age: 57
End: 2017-07-16

## 2017-07-17 ENCOUNTER — PATIENT MESSAGE (OUTPATIENT)
Dept: FAMILY MEDICINE | Facility: CLINIC | Age: 57
End: 2017-07-17

## 2017-07-24 ENCOUNTER — PATIENT MESSAGE (OUTPATIENT)
Dept: FAMILY MEDICINE | Facility: CLINIC | Age: 57
End: 2017-07-24

## 2017-07-26 ENCOUNTER — LAB VISIT (OUTPATIENT)
Dept: LAB | Facility: HOSPITAL | Age: 57
End: 2017-07-26
Attending: FAMILY MEDICINE
Payer: OTHER GOVERNMENT

## 2017-07-26 DIAGNOSIS — R73.09 ELEVATED GLUCOSE: ICD-10-CM

## 2017-07-26 DIAGNOSIS — I25.10 CORONARY ARTERY DISEASE, ANGINA PRESENCE UNSPECIFIED, UNSPECIFIED VESSEL OR LESION TYPE, UNSPECIFIED WHETHER NATIVE OR TRANSPLANTED HEART: ICD-10-CM

## 2017-07-26 LAB
ALBUMIN SERPL BCP-MCNC: 3.7 G/DL
ALP SERPL-CCNC: 61 U/L
ALT SERPL W/O P-5'-P-CCNC: 45 U/L
ANION GAP SERPL CALC-SCNC: 8 MMOL/L
AST SERPL-CCNC: 42 U/L
BILIRUB SERPL-MCNC: 0.6 MG/DL
BUN SERPL-MCNC: 14 MG/DL
CALCIUM SERPL-MCNC: 9.2 MG/DL
CHLORIDE SERPL-SCNC: 103 MMOL/L
CHOLEST/HDLC SERPL: 2.1 {RATIO}
CO2 SERPL-SCNC: 26 MMOL/L
CREAT SERPL-MCNC: 1.1 MG/DL
EST. GFR  (AFRICAN AMERICAN): >60 ML/MIN/1.73 M^2
EST. GFR  (NON AFRICAN AMERICAN): >60 ML/MIN/1.73 M^2
GLUCOSE SERPL-MCNC: 91 MG/DL
HDL/CHOLESTEROL RATIO: 46.7 %
HDLC SERPL-MCNC: 43 MG/DL
HDLC SERPL-MCNC: 92 MG/DL
LDLC SERPL CALC-MCNC: 35.2 MG/DL
NONHDLC SERPL-MCNC: 49 MG/DL
POTASSIUM SERPL-SCNC: 4.5 MMOL/L
PROT SERPL-MCNC: 7.3 G/DL
SODIUM SERPL-SCNC: 137 MMOL/L
TRIGL SERPL-MCNC: 69 MG/DL

## 2017-07-26 PROCEDURE — 80061 LIPID PANEL: CPT

## 2017-07-26 PROCEDURE — 83036 HEMOGLOBIN GLYCOSYLATED A1C: CPT

## 2017-07-26 PROCEDURE — 36415 COLL VENOUS BLD VENIPUNCTURE: CPT | Mod: PO

## 2017-07-26 PROCEDURE — 80053 COMPREHEN METABOLIC PANEL: CPT

## 2017-07-27 LAB
ESTIMATED AVG GLUCOSE: 105 MG/DL
HBA1C MFR BLD HPLC: 5.3 %

## 2017-08-13 ENCOUNTER — PATIENT MESSAGE (OUTPATIENT)
Dept: CARDIOLOGY | Facility: CLINIC | Age: 57
End: 2017-08-13

## 2017-08-25 ENCOUNTER — PATIENT MESSAGE (OUTPATIENT)
Dept: CARDIOLOGY | Facility: CLINIC | Age: 57
End: 2017-08-25

## 2017-08-25 ENCOUNTER — TELEPHONE (OUTPATIENT)
Dept: CARDIOLOGY | Facility: CLINIC | Age: 57
End: 2017-08-25

## 2017-08-28 ENCOUNTER — TELEPHONE (OUTPATIENT)
Dept: CARDIOLOGY | Facility: CLINIC | Age: 57
End: 2017-08-28

## 2017-08-28 DIAGNOSIS — I25.10 CORONARY ARTERY DISEASE, ANGINA PRESENCE UNSPECIFIED, UNSPECIFIED VESSEL OR LESION TYPE, UNSPECIFIED WHETHER NATIVE OR TRANSPLANTED HEART: Primary | ICD-10-CM

## 2017-08-28 NOTE — TELEPHONE ENCOUNTER
Left message for cardiac rehab to call me back- not sure what is going on waiting a update on patient's cardiac rehab status.

## 2017-08-29 ENCOUNTER — TELEPHONE (OUTPATIENT)
Dept: CARDIOLOGY | Facility: CLINIC | Age: 57
End: 2017-08-29

## 2017-08-29 NOTE — TELEPHONE ENCOUNTER
----- Message from Jose Miguel Esqueda RN sent at 8/29/2017  8:31 AM CDT -----  Regarding: Additional sessions  Contact: [phone]  Mr. Motley has completed the 17 sessions already approved. [facility] requires the physician to submit clinicals and patient referral for pre-authorization for additional sessions. [employer] phone is [phone]. I spoke with Mr. Motley yesterday and explained to him he cannot return until [facility]  Thank you,  Jose Miguel Esqueda RN, CCRP  [facility]

## 2017-08-29 NOTE — TELEPHONE ENCOUNTER
New referral placed for more rehab sessions per Cardiac Rehab request. Waiting for approval from .

## 2017-09-20 ENCOUNTER — PATIENT MESSAGE (OUTPATIENT)
Dept: CARDIOLOGY | Facility: CLINIC | Age: 57
End: 2017-09-20

## 2017-09-21 ENCOUNTER — TELEPHONE (OUTPATIENT)
Dept: CARDIOLOGY | Facility: CLINIC | Age: 57
End: 2017-09-21

## 2017-09-29 ENCOUNTER — TELEPHONE (OUTPATIENT)
Dept: CARDIOLOGY | Facility: CLINIC | Age: 57
End: 2017-09-29

## 2017-09-29 ENCOUNTER — TELEPHONE (OUTPATIENT)
Dept: FAMILY MEDICINE | Facility: CLINIC | Age: 57
End: 2017-09-29

## 2017-09-29 ENCOUNTER — PATIENT MESSAGE (OUTPATIENT)
Dept: CARDIOLOGY | Facility: CLINIC | Age: 57
End: 2017-09-29

## 2017-09-29 NOTE — TELEPHONE ENCOUNTER
----- Message from Nathaly Sifuentes sent at 9/29/2017 11:36 AM CDT -----  Contact: Patient  Contact patient to advise if he can take Dayquill, at [phone].    Thank you

## 2017-09-29 NOTE — TELEPHONE ENCOUNTER
----- Message from Jackelyn Sánchez sent at 9/29/2017 12:57 PM CDT -----  Contact patient to advise if he can take Dayquill, at [phone].

## 2017-09-29 NOTE — TELEPHONE ENCOUNTER
----- Message from Jackelyn Sánchez sent at 9/29/2017 12:57 PM CDT -----  Contact patient to advise if he can take Dayquill, at [phone].

## 2017-10-09 ENCOUNTER — PATIENT MESSAGE (OUTPATIENT)
Dept: FAMILY MEDICINE | Facility: CLINIC | Age: 57
End: 2017-10-09

## 2017-10-10 ENCOUNTER — PATIENT MESSAGE (OUTPATIENT)
Dept: GASTROENTEROLOGY | Facility: CLINIC | Age: 57
End: 2017-10-10

## 2017-10-10 ENCOUNTER — PATIENT MESSAGE (OUTPATIENT)
Dept: CARDIOLOGY | Facility: CLINIC | Age: 57
End: 2017-10-10

## 2017-10-11 NOTE — TELEPHONE ENCOUNTER
Spoke with Dr. Piedra- he advised that unless he can look at the images he will not be able to comment. He wanted you to come back in 3 months with a echo and some labs here at [facility].  You were seen june2017.

## 2017-11-12 ENCOUNTER — PATIENT MESSAGE (OUTPATIENT)
Dept: CARDIOLOGY | Facility: CLINIC | Age: 57
End: 2017-11-12

## 2017-11-13 DIAGNOSIS — I25.10 CORONARY ARTERY DISEASE, ANGINA PRESENCE UNSPECIFIED, UNSPECIFIED VESSEL OR LESION TYPE, UNSPECIFIED WHETHER NATIVE OR TRANSPLANTED HEART: Primary | ICD-10-CM

## 2017-11-17 ENCOUNTER — LAB VISIT (OUTPATIENT)
Dept: LAB | Facility: HOSPITAL | Age: 57
End: 2017-11-17
Attending: INTERNAL MEDICINE
Payer: OTHER GOVERNMENT

## 2017-11-17 DIAGNOSIS — I25.10 CORONARY ARTERY DISEASE, ANGINA PRESENCE UNSPECIFIED, UNSPECIFIED VESSEL OR LESION TYPE, UNSPECIFIED WHETHER NATIVE OR TRANSPLANTED HEART: ICD-10-CM

## 2017-11-17 LAB
ALBUMIN SERPL BCP-MCNC: 3.5 G/DL
ALP SERPL-CCNC: 69 U/L
ALT SERPL W/O P-5'-P-CCNC: 43 U/L
ANION GAP SERPL CALC-SCNC: 8 MMOL/L
AST SERPL-CCNC: 31 U/L
BILIRUB SERPL-MCNC: 0.6 MG/DL
BUN SERPL-MCNC: 21 MG/DL
CALCIUM SERPL-MCNC: 9.2 MG/DL
CHLORIDE SERPL-SCNC: 107 MMOL/L
CHOLEST SERPL-MCNC: 102 MG/DL
CHOLEST/HDLC SERPL: 2.4 {RATIO}
CO2 SERPL-SCNC: 26 MMOL/L
CREAT SERPL-MCNC: 1 MG/DL
EST. GFR  (AFRICAN AMERICAN): >60 ML/MIN/1.73 M^2
EST. GFR  (NON AFRICAN AMERICAN): >60 ML/MIN/1.73 M^2
GLUCOSE SERPL-MCNC: 93 MG/DL
HDLC SERPL-MCNC: 42 MG/DL
HDLC SERPL: 41.2 %
LDLC SERPL CALC-MCNC: 48 MG/DL
NONHDLC SERPL-MCNC: 60 MG/DL
POTASSIUM SERPL-SCNC: 4.6 MMOL/L
PROT SERPL-MCNC: 7.1 G/DL
SODIUM SERPL-SCNC: 141 MMOL/L
TRIGL SERPL-MCNC: 60 MG/DL

## 2017-11-17 PROCEDURE — 36415 COLL VENOUS BLD VENIPUNCTURE: CPT | Mod: PO

## 2017-11-17 PROCEDURE — 80061 LIPID PANEL: CPT

## 2017-11-17 PROCEDURE — 80053 COMPREHEN METABOLIC PANEL: CPT

## 2017-11-21 DIAGNOSIS — I25.10 CORONARY ARTERY DISEASE, ANGINA PRESENCE UNSPECIFIED, UNSPECIFIED VESSEL OR LESION TYPE, UNSPECIFIED WHETHER NATIVE OR TRANSPLANTED HEART: ICD-10-CM

## 2017-11-21 RX ORDER — LISINOPRIL 2.5 MG/1
TABLET ORAL
Qty: 90 TABLET | Refills: 1 | Status: SHIPPED | OUTPATIENT
Start: 2017-11-21 | End: 2018-05-04 | Stop reason: SDUPTHER

## 2017-11-21 RX ORDER — ATORVASTATIN CALCIUM 80 MG/1
TABLET, FILM COATED ORAL
Qty: 90 TABLET | Refills: 1 | Status: SHIPPED | OUTPATIENT
Start: 2017-11-21 | End: 2018-01-03 | Stop reason: SDUPTHER

## 2017-11-21 NOTE — PROGRESS NOTES
Refill Authorization Note     is requesting a refill authorization.    Brief assessment and rationale for refill: APPROVE: prr  Amount/Quantity of medication ordered: 90d         Refills Authorized: Yes  If authorized number of refills: 1           Medication Therapy Plan: Lipids and CMP recently updated;  Approve 6 more mo   Name and strength of medication: ATORVASTATIN TABS 80MG / lisinopril 20 mg  How patient will take medication: t1t po daily   Medication reconciliation completed: No  Comments:   Lab Results   Component Value Date    CREATININE 1.0 11/17/2017    BUN 21 (H) 11/17/2017     11/17/2017    K 4.6 11/17/2017     11/17/2017    CO2 26 11/17/2017      BP Readings from Last 3 Encounters:   06/28/17 124/82   06/27/17 (!) 141/84   02/03/17 130/80      Lab Results   Component Value Date    CHOL 102 (L) 11/17/2017    CHOL 92 (L) 07/26/2017    CHOL 203 (H) 05/05/2015     Lab Results   Component Value Date    HDL 42 11/17/2017    HDL 43 07/26/2017    HDL 48 05/05/2015     Lab Results   Component Value Date    LDLCALC 48.0 (L) 11/17/2017    LDLCALC 35.2 (L) 07/26/2017    LDLCALC 130.8 05/05/2015     Lab Results   Component Value Date    TRIG 60 11/17/2017    TRIG 69 07/26/2017    TRIG 121 05/05/2015     Lab Results   Component Value Date    CHOLHDL 41.2 11/17/2017    CHOLHDL 46.7 07/26/2017    CHOLHDL 23.6 05/05/2015

## 2017-11-29 ENCOUNTER — CLINICAL SUPPORT (OUTPATIENT)
Dept: CARDIOLOGY | Facility: CLINIC | Age: 57
End: 2017-11-29
Attending: INTERNAL MEDICINE
Payer: OTHER GOVERNMENT

## 2017-11-29 DIAGNOSIS — I25.10 CORONARY ARTERY DISEASE, ANGINA PRESENCE UNSPECIFIED, UNSPECIFIED VESSEL OR LESION TYPE, UNSPECIFIED WHETHER NATIVE OR TRANSPLANTED HEART: ICD-10-CM

## 2017-11-29 LAB
DIASTOLIC DYSFUNCTION: YES
ESTIMATED PA SYSTOLIC PRESSURE: 16.25
RETIRED EF AND QEF - SEE NOTES: 25 (ref 55–65)
TRICUSPID VALVE REGURGITATION: ABNORMAL

## 2017-11-29 PROCEDURE — 93306 TTE W/DOPPLER COMPLETE: CPT | Mod: PBBFAC,PO | Performed by: INTERNAL MEDICINE

## 2017-12-01 ENCOUNTER — PATIENT MESSAGE (OUTPATIENT)
Dept: FAMILY MEDICINE | Facility: CLINIC | Age: 57
End: 2017-12-01

## 2017-12-01 ENCOUNTER — PATIENT MESSAGE (OUTPATIENT)
Dept: CARDIOLOGY | Facility: CLINIC | Age: 57
End: 2017-12-01

## 2017-12-01 NOTE — TELEPHONE ENCOUNTER
Let patient know the heart is not squeezing like it suppose to and Dr Washington will be in Monday.

## 2017-12-04 ENCOUNTER — PATIENT MESSAGE (OUTPATIENT)
Dept: FAMILY MEDICINE | Facility: CLINIC | Age: 57
End: 2017-12-04

## 2017-12-05 ENCOUNTER — OFFICE VISIT (OUTPATIENT)
Dept: CARDIOLOGY | Facility: CLINIC | Age: 57
End: 2017-12-05
Payer: OTHER GOVERNMENT

## 2017-12-05 ENCOUNTER — PATIENT MESSAGE (OUTPATIENT)
Dept: FAMILY MEDICINE | Facility: CLINIC | Age: 57
End: 2017-12-05

## 2017-12-05 VITALS
HEIGHT: 71 IN | DIASTOLIC BLOOD PRESSURE: 78 MMHG | BODY MASS INDEX: 24.32 KG/M2 | HEART RATE: 74 BPM | SYSTOLIC BLOOD PRESSURE: 126 MMHG | WEIGHT: 173.75 LBS

## 2017-12-05 DIAGNOSIS — E78.5 DYSLIPIDEMIA: ICD-10-CM

## 2017-12-05 DIAGNOSIS — I25.10 CORONARY ARTERY DISEASE INVOLVING NATIVE CORONARY ARTERY OF NATIVE HEART WITHOUT ANGINA PECTORIS: ICD-10-CM

## 2017-12-05 DIAGNOSIS — Z95.5 S/P CORONARY ARTERY STENT PLACEMENT: ICD-10-CM

## 2017-12-05 PROCEDURE — 99213 OFFICE O/P EST LOW 20 MIN: CPT | Mod: PBBFAC,PO | Performed by: INTERNAL MEDICINE

## 2017-12-05 PROCEDURE — 99999 PR PBB SHADOW E&M-EST. PATIENT-LVL III: CPT | Mod: PBBFAC,,, | Performed by: INTERNAL MEDICINE

## 2017-12-05 PROCEDURE — 99214 OFFICE O/P EST MOD 30 MIN: CPT | Mod: S$PBB,,, | Performed by: INTERNAL MEDICINE

## 2017-12-05 NOTE — PROGRESS NOTES
Subjective:    Patient ID:  Dell Motley is a 57 y.o. male who presents for follow-up of Coronary Artery Disease (follow up ) and Hyperlipidemia      Pt here for f/u. [facility] and EF was calculated at 43% and seemed c/w EF at the time of his MI in June. He had a repeat Echo here last week and had WMA's c/w previous Ramus occlusion but the reader estimated the EF at 25-30% which has the patient very concerned. He has no symptomology c/w low EF. He exercises regularly and [place] and was able to exert w/o problem. He denies any palpitations, dizziness, syncope or pre-syncope. He denies any chest pain, SOB, PND, orthopnea. He denies claudication, lower extremity edema. He denies exertional symptoms.         Review of Systems   Constitution: Negative for weight gain and weight loss.   HENT: Negative.    Eyes: Negative.    Cardiovascular: Negative for chest pain, claudication, cyanosis, dyspnea on exertion, irregular heartbeat, leg swelling, near-syncope, orthopnea (no PND), palpitations and syncope.   Respiratory: Negative for cough, hemoptysis, shortness of breath and snoring.    Endocrine: Negative.    Skin: Negative.    Musculoskeletal: Negative for joint pain, muscle cramps, muscle weakness and myalgias.   Gastrointestinal: Negative for diarrhea, hematemesis, nausea and vomiting.   Genitourinary: Negative.    Neurological: Negative for dizziness, focal weakness, light-headedness, loss of balance, numbness, paresthesias and seizures.   Psychiatric/Behavioral: Negative.         Objective:    Physical Exam   Constitutional: He is oriented to person, place, and time. He appears well-developed and well-nourished.   HENT:   Mouth/Throat: Oropharynx is clear and moist.   Eyes: Pupils are equal, round, and reactive to light.   Neck: Normal range of motion. No thyromegaly present.   Cardiovascular: Normal rate, regular rhythm, S1 normal, S2 normal,  normal heart sounds, intact distal pulses and normal pulses.   No extrasystoles are present. PMI is not displaced.  Exam reveals no friction rub.    No murmur heard.  Pulmonary/Chest: Effort normal and breath sounds normal. He has no wheezes. He has no rales. He exhibits no tenderness.   Abdominal: Soft. Bowel sounds are normal. He exhibits no distension and no mass. There is no tenderness.   Musculoskeletal: Normal range of motion. He exhibits no edema.   Neurological: He is alert and oriented to person, place, and time.   Skin: Skin is warm and dry.   Vitals reviewed.      Test(s) Reviewed  I have reviewed the following in detail:  [] Stress test   [x] Angiography   [x] Echocardiogram   [] Labs   [] Other:         Assessment:       1. Coronary artery disease involving native coronary artery of native heart without angina pectoris    2. S/P coronary artery stent placement - x2 Ramus 06/2017; mild luminal disease other vessels    3. Dyslipidemia         Plan:       I reviewed the Echo images along with another cardiologist and we agree EF in 45-50% range at worse.  Pt was somewhat reassured but still apprehensive.   Will have Echo reviewed by other partners to get a consensus about the EF; if needed can order MUGA  Otherwise he is clinically doing well.  May decrease atorvastatin to 40 mg daily.  Continue other meds  Continue exercise routine  F/u 6 months

## 2017-12-08 RX ORDER — TICAGRELOR 90 MG/1
TABLET ORAL
Qty: 180 TABLET | Refills: 1 | Status: SHIPPED | OUTPATIENT
Start: 2017-12-08 | End: 2018-12-13

## 2017-12-12 ENCOUNTER — PATIENT MESSAGE (OUTPATIENT)
Dept: CARDIOLOGY | Facility: CLINIC | Age: 57
End: 2017-12-12

## 2017-12-12 ENCOUNTER — TELEPHONE (OUTPATIENT)
Dept: CARDIOLOGY | Facility: CLINIC | Age: 57
End: 2017-12-12

## 2017-12-18 ENCOUNTER — PATIENT OUTREACH (OUTPATIENT)
Dept: ADMINISTRATIVE | Facility: HOSPITAL | Age: 57
End: 2017-12-18

## 2018-01-03 ENCOUNTER — OFFICE VISIT (OUTPATIENT)
Dept: FAMILY MEDICINE | Facility: CLINIC | Age: 58
End: 2018-01-03
Payer: OTHER GOVERNMENT

## 2018-01-03 VITALS
TEMPERATURE: 99 F | HEART RATE: 72 BPM | SYSTOLIC BLOOD PRESSURE: 120 MMHG | DIASTOLIC BLOOD PRESSURE: 76 MMHG | WEIGHT: 172.38 LBS | HEIGHT: 71 IN | BODY MASS INDEX: 24.13 KG/M2

## 2018-01-03 DIAGNOSIS — I25.10 CORONARY ARTERY DISEASE, ANGINA PRESENCE UNSPECIFIED, UNSPECIFIED VESSEL OR LESION TYPE, UNSPECIFIED WHETHER NATIVE OR TRANSPLANTED HEART: ICD-10-CM

## 2018-01-03 DIAGNOSIS — E78.5 HYPERLIPIDEMIA, UNSPECIFIED HYPERLIPIDEMIA TYPE: Primary | ICD-10-CM

## 2018-01-03 PROCEDURE — 99213 OFFICE O/P EST LOW 20 MIN: CPT | Mod: S$PBB,,, | Performed by: FAMILY MEDICINE

## 2018-01-03 PROCEDURE — 99213 OFFICE O/P EST LOW 20 MIN: CPT | Mod: PBBFAC,PN | Performed by: FAMILY MEDICINE

## 2018-01-03 PROCEDURE — 99999 PR PBB SHADOW E&M-EST. PATIENT-LVL III: CPT | Mod: PBBFAC,,, | Performed by: FAMILY MEDICINE

## 2018-01-03 RX ORDER — ATORVASTATIN CALCIUM 80 MG/1
40 TABLET, FILM COATED ORAL DAILY
Qty: 90 TABLET | Refills: 1
Start: 2018-01-03 | End: 2018-10-30 | Stop reason: SDUPTHER

## 2018-01-03 NOTE — PROGRESS NOTES
Answers for HPI/ROS submitted by the patient on 1/1/2018   activity change: No  unexpected weight change: No  neck pain: No  hearing loss: No  rhinorrhea: No  trouble swallowing: No  eye discharge: No  visual disturbance: No  chest tightness: No  wheezing: No  chest pain: No  palpitations: No  blood in stool: No  constipation: No  vomiting: No  diarrhea: No  polydipsia: No  polyuria: No  difficulty urinating: No  urgency: No  hematuria: No  joint swelling: No  arthralgias: No  headaches: No  weakness: No  confusion: No  dysphoric mood: No

## 2018-01-06 NOTE — PROGRESS NOTES
Subjective:    THIS DOCUMENT WAS MADE IN PART WITH [employer] DICTATION SOFTWARE. OCCASIONALLY THIS SOFTWARE MAY MISINTERPRET WORDS OR PHRASES.     Patient ID: Dell Motley is a 57 y.o. male.    Chief Complaint: Follow-up    HPI      Follow-up dyslipidemia. This is a 57-year-old gentleman who has a history of a mile daily cholesterol but coronary artery disease and stent placement. He has been on atorvastatin 80 mg since his MR. He's not having any side effects but cholesterol is rather low and he was wondering if it would be okay to reduce the rest of his medication.     We also did discuss his recent echocardiogram and estimated ejection fraction and that there was some varying opinions on this. He's considering an opinion externally and will keep me informed.    Active Ambulatory Problems     Diagnosis Date Noted    History of colon polyps 05/15/2015    Coronary artery disease involving native coronary artery of native heart without angina pectoris 06/27/2017    S/P coronary artery stent placement - x2 Ramus 06/2017; mild luminal disease other vessels 06/27/2017    Dyslipidemia 06/27/2017    Family history of coronary artery disease 06/27/2017     Resolved Ambulatory Problems     Diagnosis Date Noted    No Resolved Ambulatory Problems     Past Medical History:   Diagnosis Date    Hypertension     SNHL (sensorineural hearing loss)        Review of Systems   Constitutional: Negative for activity change and unexpected weight change.   HENT: Negative for hearing loss, rhinorrhea and trouble swallowing.    Eyes: Negative for discharge and visual disturbance.   Respiratory: Negative for chest tightness and wheezing.    Cardiovascular: Negative for chest pain and palpitations.   Gastrointestinal: Negative for blood in stool, constipation, diarrhea and vomiting.   Endocrine: Negative for polydipsia and polyuria.   Genitourinary: Negative for difficulty urinating, hematuria and urgency.   Musculoskeletal: Negative  for arthralgias, joint swelling and neck pain.   Neurological: Negative for weakness and headaches.   Psychiatric/Behavioral: Negative for confusion and dysphoric mood.       Objective:      Physical Exam   Constitutional: He is oriented to person, place, and time. He appears well-developed and well-nourished. No distress.   HENT:   Head: Normocephalic and atraumatic.   Eyes: Conjunctivae are normal. No scleral icterus.   Pulmonary/Chest: Effort normal. No respiratory distress.   Neurological: He is alert and oriented to person, place, and time. Coordination normal.   Skin: He is not diaphoretic.   Psychiatric: He has a normal mood and affect. His behavior is normal.         Assessment:       1. Hyperlipidemia, unspecified hyperlipidemia type    2. Coronary artery disease, angina presence unspecified, unspecified vessel or lesion type, unspecified whether native or transplanted heart        Plan:        Lipids reviewed. Discussed current recommendations and high-dose statin therapy may be more important than individual numbers in someone who has had an event. But his numbers are rather low so will reduce the dosage of atorvastatin to 40 mg, of course he'll monitor his diet and exercise and repeat labs in two months.

## 2018-01-31 ENCOUNTER — PATIENT MESSAGE (OUTPATIENT)
Dept: GASTROENTEROLOGY | Facility: CLINIC | Age: 58
End: 2018-01-31

## 2018-03-02 ENCOUNTER — LAB VISIT (OUTPATIENT)
Dept: LAB | Facility: HOSPITAL | Age: 58
End: 2018-03-02
Attending: FAMILY MEDICINE
Payer: OTHER GOVERNMENT

## 2018-03-02 DIAGNOSIS — E78.5 HYPERLIPIDEMIA, UNSPECIFIED HYPERLIPIDEMIA TYPE: ICD-10-CM

## 2018-03-02 LAB
ALT SERPL W/O P-5'-P-CCNC: 41 U/L
CHOLEST SERPL-MCNC: 104 MG/DL
CHOLEST/HDLC SERPL: 2 {RATIO}
HDLC SERPL-MCNC: 51 MG/DL
HDLC SERPL: 49 %
LDLC SERPL CALC-MCNC: 41 MG/DL
NONHDLC SERPL-MCNC: 53 MG/DL
TRIGL SERPL-MCNC: 60 MG/DL

## 2018-03-02 PROCEDURE — 84460 ALANINE AMINO (ALT) (SGPT): CPT

## 2018-03-02 PROCEDURE — 36415 COLL VENOUS BLD VENIPUNCTURE: CPT | Mod: PO

## 2018-03-02 PROCEDURE — 80061 LIPID PANEL: CPT

## 2018-03-03 ENCOUNTER — PATIENT MESSAGE (OUTPATIENT)
Dept: FAMILY MEDICINE | Facility: CLINIC | Age: 58
End: 2018-03-03

## 2018-03-05 NOTE — TELEPHONE ENCOUNTER
Please see my chart msg and advise.     Pt is aware that you are out the office till 03/06/2018.    Thank you.

## 2018-05-05 ENCOUNTER — PATIENT MESSAGE (OUTPATIENT)
Dept: ENDOSCOPY | Facility: HOSPITAL | Age: 58
End: 2018-05-05

## 2018-05-07 ENCOUNTER — TELEPHONE (OUTPATIENT)
Dept: GASTROENTEROLOGY | Facility: CLINIC | Age: 58
End: 2018-05-07

## 2018-05-07 RX ORDER — LISINOPRIL 2.5 MG/1
TABLET ORAL
Qty: 90 TABLET | Refills: 1 | Status: SHIPPED | OUTPATIENT
Start: 2018-05-07

## 2018-05-07 NOTE — PROGRESS NOTES
Refill Authorization Note     is requesting a refill authorization.    Brief assessment and rationale for refill: APPROVE: prr  Amount/Quantity of medication ordered: 90d         Refills Authorized: Yes  If authorized number of refills: 1           Medication Therapy Plan: Bp controlled; labs WNL: marii 6 more  Name and strength of medication: LISINOPRIL TABS 2.5MG  How patient will take medication: t1t po daily   Medication reconciliation completed: No  Comments:   BP Readings from Last 3 Encounters:   01/03/18 120/76   12/05/17 126/78   06/28/17 124/82      Lab Results   Component Value Date    CREATININE 1.0 11/17/2017    BUN 21 (H) 11/17/2017     11/17/2017    K 4.6 11/17/2017     11/17/2017    CO2 26 11/17/2017

## 2018-05-07 NOTE — TELEPHONE ENCOUNTER
This pt is scheduled for a colonoscopy on 5/11/18.  Can he hold his brilinta.    If so how many days??

## 2018-05-08 NOTE — TELEPHONE ENCOUNTER
Spoke to pt.  He stopped his Brilinta Monday and stopped his aspirin last week    Instructed pt to restart his Aspirin 81 mg  But continue to hold Brilinta

## 2018-05-11 ENCOUNTER — ANESTHESIA (OUTPATIENT)
Dept: ENDOSCOPY | Facility: HOSPITAL | Age: 58
End: 2018-05-11
Payer: OTHER GOVERNMENT

## 2018-05-11 ENCOUNTER — SURGERY (OUTPATIENT)
Age: 58
End: 2018-05-11

## 2018-05-11 ENCOUNTER — ANESTHESIA EVENT (OUTPATIENT)
Dept: ENDOSCOPY | Facility: HOSPITAL | Age: 58
End: 2018-05-11
Payer: OTHER GOVERNMENT

## 2018-05-11 ENCOUNTER — HOSPITAL ENCOUNTER (OUTPATIENT)
Facility: HOSPITAL | Age: 58
Discharge: HOME | End: 2018-05-11
Attending: INTERNAL MEDICINE | Admitting: INTERNAL MEDICINE
Payer: OTHER GOVERNMENT

## 2018-05-11 VITALS
WEIGHT: 165 LBS | SYSTOLIC BLOOD PRESSURE: 114 MMHG | TEMPERATURE: 98 F | BODY MASS INDEX: 23.1 KG/M2 | DIASTOLIC BLOOD PRESSURE: 69 MMHG | HEIGHT: 71 IN | RESPIRATION RATE: 13 BRPM | HEART RATE: 48 BPM | OXYGEN SATURATION: 100 %

## 2018-05-11 DIAGNOSIS — Z86.010 HX OF COLONIC POLYP: ICD-10-CM

## 2018-05-11 PROCEDURE — D9220A PRA ANESTHESIA: Mod: 33,CRNA,, | Performed by: NURSE ANESTHETIST, CERTIFIED REGISTERED

## 2018-05-11 PROCEDURE — 00811 ANES LWR INTST NDSC NOS: CPT | Mod: PO | Performed by: INTERNAL MEDICINE

## 2018-05-11 PROCEDURE — 45385 COLONOSCOPY W/LESION REMOVAL: CPT | Mod: PT,,, | Performed by: INTERNAL MEDICINE

## 2018-05-11 PROCEDURE — 45385 COLONOSCOPY W/LESION REMOVAL: CPT | Mod: PO | Performed by: INTERNAL MEDICINE

## 2018-05-11 PROCEDURE — 27201089 HC SNARE, DISP (ANY): Mod: PO | Performed by: INTERNAL MEDICINE

## 2018-05-11 PROCEDURE — D9220A PRA ANESTHESIA: Mod: 33,ANES,, | Performed by: ANESTHESIOLOGY

## 2018-05-11 PROCEDURE — 88305 TISSUE EXAM BY PATHOLOGIST: CPT | Mod: 59 | Performed by: PATHOLOGY

## 2018-05-11 PROCEDURE — 88305 TISSUE EXAM BY PATHOLOGIST: CPT | Mod: 26,,, | Performed by: PATHOLOGY

## 2018-05-11 PROCEDURE — 37000009 HC ANESTHESIA EA ADD 15 MINS: Mod: PO | Performed by: INTERNAL MEDICINE

## 2018-05-11 PROCEDURE — 37000008 HC ANESTHESIA 1ST 15 MINUTES: Mod: PO | Performed by: INTERNAL MEDICINE

## 2018-05-11 PROCEDURE — 63600175 PHARM REV CODE 636 W HCPCS: Mod: PO | Performed by: NURSE ANESTHETIST, CERTIFIED REGISTERED

## 2018-05-11 RX ORDER — LIDOCAINE HCL/PF 100 MG/5ML
SYRINGE (ML) INTRAVENOUS
Status: DISCONTINUED | OUTPATIENT
Start: 2018-05-11 | End: 2018-05-11

## 2018-05-11 RX ORDER — PROPOFOL 10 MG/ML
VIAL (ML) INTRAVENOUS
Status: DISCONTINUED | OUTPATIENT
Start: 2018-05-11 | End: 2018-05-11

## 2018-05-11 RX ORDER — SODIUM CHLORIDE, SODIUM LACTATE, POTASSIUM CHLORIDE, CALCIUM CHLORIDE 600; 310; 30; 20 MG/100ML; MG/100ML; MG/100ML; MG/100ML
INJECTION, SOLUTION INTRAVENOUS CONTINUOUS
Status: DISCONTINUED | OUTPATIENT
Start: 2018-05-11 | End: 2018-05-11 | Stop reason: HOSPADM

## 2018-05-11 RX ADMIN — PROPOFOL 30 MG: 10 INJECTION, EMULSION INTRAVENOUS at 09:05

## 2018-05-11 RX ADMIN — PROPOFOL 30 MG: 10 INJECTION, EMULSION INTRAVENOUS at 10:05

## 2018-05-11 RX ADMIN — PROPOFOL 50 MG: 10 INJECTION, EMULSION INTRAVENOUS at 09:05

## 2018-05-11 RX ADMIN — PROPOFOL 100 MG: 10 INJECTION, EMULSION INTRAVENOUS at 09:05

## 2018-05-11 RX ADMIN — LIDOCAINE HYDROCHLORIDE 100 MG: 20 INJECTION, SOLUTION INTRAVENOUS at 09:05

## 2018-05-11 RX ADMIN — SODIUM CHLORIDE, SODIUM LACTATE, POTASSIUM CHLORIDE, CALCIUM CHLORIDE: 600; 310; 30; 20 INJECTION, SOLUTION INTRAVENOUS at 09:05

## 2018-05-11 NOTE — ANESTHESIA POSTPROCEDURE EVALUATION
"Anesthesia Post Evaluation    Patient: Dell Motley    Procedure(s) Performed: Procedure(s) (LRB):  COLONOSCOPY (N/A)    Final Anesthesia Type: general  Patient location during evaluation: PACU  Patient participation: Yes- Able to Participate  Level of consciousness: awake and alert  Post-procedure vital signs: reviewed and stable  Pain management: adequate  Airway patency: patent  PONV status at discharge: No PONV  Anesthetic complications: no      Cardiovascular status: blood pressure returned to baseline  Respiratory status: unassisted  Hydration status: euvolemic  Follow-up not needed.        Visit Vitals  /66   Pulse (!) 48   Temp 36.4 °C (97.5 °F) (Skin)   Resp 13   Ht 5' 11" (1.803 m)   Wt 74.8 kg (165 lb)   SpO2 100%   BMI 23.01 kg/m²       Pain/Cindy Score: Pain Assessment Performed: Yes (5/11/2018 10:35 AM)  Presence of Pain: non-verbal indicators absent (5/11/2018 10:35 AM)  Cindy Score: 7 (5/11/2018 10:35 AM)      "

## 2018-05-11 NOTE — H&P
History & Physical - Short Stay  Gastroenterology      SUBJECTIVE:     Procedure: Colonoscopy    Chief Complaint/Indication for Procedure: Surveillance, Hx of colon polyps.    History of Present Illness:  Asymptomatic  Office Visit     1/3/2018  [facility]      Forrest Clemente MD   Family Medicine   Hyperlipidemia, unspecified hyperlipidemia type +1 more   Dx   Follow-up   Reason for Visit       Progress Notes        Subjective:    THIS DOCUMENT WAS MADE IN PART WITH [employer] DICTATION SOFTWARE. OCCASIONALLY THIS SOFTWARE MAY MISINTERPRET WORDS OR PHRASES.      Patient ID: Dell Motley is a 57 y.o. male.     Chief Complaint: Follow-up     HPI       Follow-up dyslipidemia. This is a 57-year-old gentleman who has a history of a mile daily cholesterol but coronary artery disease and stent placement. He has been on atorvastatin 80 mg since his MR. He's not having any side effects but cholesterol is rather low and he was wondering if it would be okay to reduce the rest of his medication.      We also did discuss his recent echocardiogram and estimated ejection fraction and that there was some varying opinions on this. He's considering an opinion externally and will keep me informed.    Assessment:       1. Hyperlipidemia, unspecified hyperlipidemia type    2. Coronary artery disease, angina presence unspecified, unspecified vessel or lesion type, unspecified whether native or transplanted heart        Plan:        Lipids reviewed. Discussed current recommendations and high-dose statin therapy may be more important than individual numbers in someone who has had an event. But his numbers are rather low so will reduce the dosage of atorvastatin to 40 mg, of course he'll monitor his diet and exercise and repeat labs in two months.              See last colonoscopy 5/15/2015  Impression:  - Two 1 to 2 mm polyps in the proximal sigmoid colon. Resected and retrieved.                        - One benign appearing 1 mm polyp in the rectum.   Resected and retrieved.                       - Internal hemorrhoids.                       - The examination was otherwise normal.                       - The examined portion of the ileum was normal.  Recommendation:      - Discharge patient to home.                       - Await pathology results.                       - Repeat colonoscopy in 3 years for surveillance.                       - High fiber diet.                       - Continue present medications.                       - Resume aspirin at prior dose tomorrow.                       - Call the G.I. clinic in 2 weeks for reports (if                        you haven't heard from us sooner) [phone].                       - Patient has a contact number available for                        emergencies. The signs and symptoms of potential                        delayed complications were discussed with the                        patient. Return to normal activities tomorrow.                        Written discharge instructions were provided to the                        patient.                       - Return to normal activities tomorrow.  Bud Christian MD  5/15/2015     SPECIMEN  1) Rectal polyp.  2) Sigmoid polyp.  FINAL PATHOLOGIC DIAGNOSIS  #1. HYPERPLASTIC POLYP  #2. 2 FRAGMENTS OF TUBULAR ADENOMA AND 2 FRAGMENTS OF HYPERPLASTIC POLYP      PTA Medications   Medication Sig    aspirin (ECOTRIN) 81 MG EC tablet Take 81 mg by mouth once daily.      atorvastatin (LIPITOR) 80 MG tablet Take 0.5 tablets (40 mg total) by mouth once daily.    BRILINTA 90 mg tablet TAKE 1 TABLET TWICE A DAY    fish oil-omega-3 fatty acids 300-1,000 mg capsule Take 2 g by mouth once daily.    lisinopril (PRINIVIL,ZESTRIL) 2.5 MG tablet TAKE 1 TABLET DAILY    multivitamin (THERAGRAN) per tablet Take 1 tablet by mouth once daily.       Review of patient's allergies indicates:   Allergen Reactions    Codeine  "Nausea And Vomiting        Past Medical History:   Diagnosis Date    Coronary artery disease     Hypertension     SNHL (sensorineural hearing loss)      Past Surgical History:   Procedure Laterality Date    CARDIAC SURGERY      cardiac stents x2    COLONOSCOPY W/ POLYPECTOMY  6/25/2013  [facility] ([place]    4 mm pedunculated polyp in the distal descending colon.  ADENOMATOUS POLYP.   3 mm rectal polyp.  ADENOMATOUS POLYP.    COLONOSCOPY W/ POLYPECTOMY  05/15/2015    [facility].   Two 1 to 2 mm polyps in the proximal sigmoid colon.  TUBULAR ADENOMA.   One benign appearing 1 mm polyp in the rectum.  HYPERPLASTIC POLYP.    Internal hemorrhoids.     Family History   Problem Relation Age of Onset    Lung cancer Father     Heart disease Unknown         Remote in second degree relatives     Social History   Substance Use Topics    Smoking status: Never Smoker    Smokeless tobacco: Former User      Comment: Quit 20 years ago.    Alcohol use 3.0 oz/week     5 Glasses of wine per week      Comment: on weekends         OBJECTIVE:     Vital Signs (Most Recent)  Temp: 98.1 °F (36.7 °C) (05/11/18 0909)  Pulse: (!) 52 (05/11/18 0909)  Resp: 16 (05/11/18 0909)  BP: 121/71 (05/11/18 0909)  SpO2: 100 % (05/11/18 0909)    Physical Exam:          : Ht 5' 11" (1.803 m)   Wt 78.2 kg (172 lb 6.4 oz)   BMI 24.04 kg/m²                                                      GENERAL:  Comfortable, in no acute distress.                                 HEENT EXAM:  Nonicteric.  No adenopathy.  Oropharynx is clear.               NECK:  Supple.                                                               LUNGS:  Clear.                                                               CARDIAC:  Regular rate and rhythm.  S1, S2.  No murmur.                      ABDOMEN:  Soft, positive bowel sounds, nontender.  No hepatosplenomegaly or masses.  No rebound or guarding.                                             EXTREMITIES:  No edema.   "   MENTAL STATUS:  Alert and oriented.    ASSESSMENT/PLAN:     Assessment:  Surveillance, Hx of colon polyps.    Plan: Colonoscopy    Anesthesia Plan:   MAC / General Anaesthesia    ASA Grade: ASA 2 - Patient with mild systemic disease with no functional limitations    MALLAMPATI SCORE: I (soft palate, uvula, fauces, and tonsillar pillars visible)

## 2018-05-11 NOTE — PROVATION PATIENT INSTRUCTIONS
Discharge Summary/Instructions for after Colonoscopy with   Biopsy/Polypectomy  Dell Motley    Friday, May 11, 2018  Bud Christian MD  RESTRICTIONS ON ACTIVITY:  - Do not drive a car or operate machinery until the day after the procedure.      - The following day: return to full activity including work.  - For  3 days: No heavy lifting, straining or running.  - Diet: You can have solid foods, but no gassy foods (i.e. beans, broccoli,   cabbage, etc).  TREATMENT FOR COMMON SIDE EFFECTS:  - Mild abdominal pain and bloating or excessive gas: rest, eat lightly and   use a heating pad.  SYMPTOMS TO WATCH FOR AND REPORT TO YOUR PHYSICIAN:  1. Severe abdominal pain.  2. Fever within 24 hours after a procedure.  3. A large amount of rectal bleeding. (A small amount of blood from the   rectum is not serious, especially if hemorrhoids are present.  3.  Because air was put into your colon during the procedure, expelling   large amounts of air from your rectum is normal.  4.  You may not have a bowel movement for 1-3 days because of the   colonoscopy prep.  This is normal.  5.  Call immediately if you notice any of the following:   Chills and/or fever over 101   Persistent vomiting   Severe abdominal pain, other than gas cramps   Severe chest pain   Black, tarry stools   Any bleeding - exceeding one tablespoon  Your doctor recommends these additional instructions:  You are being discharged to home.   We are waiting for your pathology results.   Your physician has recommended a repeat colonoscopy in five years for   surveillance.   Eat a high fiber diet.   None  If you have any questions or problems, please call your physician.  EMERGENCY PHONE NUMBER: [phone]  LAB RESULTS: Call in two (2) weeks for lab results, [phone]  ___________________________________________  Nurse Signature  ___________________________________________  Patient/Designated Responsible Party Signature  Bud Christian  MD  5/11/2018 10:33:17 AM  This report has been verified and signed electronically.

## 2018-05-11 NOTE — TRANSFER OF CARE
"Anesthesia Transfer of Care Note    Patient: Dell Motley    Procedure(s) Performed: Procedure(s) (LRB):  COLONOSCOPY (N/A)    Patient location: PACU    Anesthesia Type: general    Transport from OR: Transported from OR on room air with adequate spontaneous ventilation    Post pain: adequate analgesia    Post assessment: no apparent anesthetic complications and tolerated procedure well    Post vital signs: stable    Level of consciousness: awake and sedated    Nausea/Vomiting: no nausea/vomiting    Complications: none    Transfer of care protocol was followed      Last vitals:   Visit Vitals  /71 (BP Location: Right arm, Patient Position: Lying)   Pulse (!) 52   Temp 36.7 °C (98.1 °F) (Skin)   Resp 16   Ht 5' 11" (1.803 m)   Wt 74.8 kg (165 lb)   SpO2 100%   BMI 23.01 kg/m²     "

## 2018-05-11 NOTE — ANESTHESIA PREPROCEDURE EVALUATION
05/11/2018  Dell Motley is a 57 y.o., male.    Anesthesia Evaluation    I have reviewed the Patient Summary Reports.    I have reviewed the Nursing Notes.   I have reviewed the Medications.     Review of Systems  Anesthesia Hx:  Denies Family Hx of Anesthesia complications.   Denies Personal Hx of Anesthesia complications.   Cardiovascular:   Hypertension CAD asymptomatic CABG/stent  ECG has been reviewed. Stent x 2    Pulmonary:  Pulmonary Normal    Renal/:  Renal/ Normal     Hepatic/GI:   Bowel Prep.    Neurological:  Neurology Normal    Endocrine:  Endocrine Normal        Physical Exam  General:  Well nourished    Airway/Jaw/Neck:  Airway Findings: Mouth Opening: Normal Tongue: Normal  General Airway Assessment: Adult  Mallampati: II  TM Distance: Normal, at least 6 cm         Dental:  DENTAL FINDINGS: Normal   Chest/Lungs:  Chest/Lungs Clear    Heart/Vascular:  Heart Findings: Normal            Anesthesia Plan  Type of Anesthesia, risks & benefits discussed:  Anesthesia Type:  general  Patient's Preference:   Intra-op Monitoring Plan: standard ASA monitors  Intra-op Monitoring Plan Comments:   Post Op Pain Control Plan:   Post Op Pain Control Plan Comments:   Induction:   IV  Beta Blocker:  Patient is not currently on a Beta-Blocker (No further documentation required).       Informed Consent: Patient understands risks and agrees with Anesthesia plan.  Questions answered. Anesthesia consent signed with patient.  ASA Score: 3     Day of Surgery Review of History & Physical:    H&P update referred to the surgeon.         Ready For Surgery From Anesthesia Perspective.

## 2018-05-11 NOTE — BRIEF OP NOTE
Discharge Note  Short Stay      SUMMARY     Admit Date: 5/11/2018    Attending Physician: Bud Christian Jr., MD     Discharge Physician: Bud Christian Jr., MD    Discharge Date: 5/11/2018 10:34 AM    Final Diagnosis: Screen for colon cancer [Z12.11]  History of colon polyps [Z86.010]  Impression:          - One 2 mm polyp in the rectum, removed with a cold                        snare. Resected and retrieved.                       - One 2 mm polyp at the hepatic flexure, removed                        with a cold snare. Resected and retrieved.                       - Non-bleeding internal hemorrhoids.                       - The examination was otherwise normal.                       - The examined portion of the ileum was normal.  Recommendation:      - Discharge patient to home.                       - Await pathology results.                       - Repeat colonoscopy in 5 years for surveillance.                       - High fiber diet.                       - Call the G.I. clinic in 2 weeks for reports (if                        you haven't heard from us sooner) [phone].                       - Continue present medications.                       - Patient has a contact number available for                        emergencies. The signs and symptoms of potential                        delayed complications were discussed with the                        patient. Return to normal activities tomorrow.                        Written discharge instructions were provided to the                        patient.                       - Return to normal activities tomorrow.  Bud Christian MD  5/11/2018   Disposition: HOME OR SELF CARE    Patient Instructions:   Current Discharge Medication List      CONTINUE these medications which have NOT CHANGED    Details   aspirin (ECOTRIN) 81 MG EC tablet Take 81 mg by mouth once daily.        atorvastatin (LIPITOR) 80 MG tablet Take 0.5 tablets (40 mg total) by mouth  once daily.  Qty: 90 tablet, Refills: 1    Associated Diagnoses: Coronary artery disease, angina presence unspecified, unspecified vessel or lesion type, unspecified whether native or transplanted heart      BRILINTA 90 mg tablet TAKE 1 TABLET TWICE A DAY  Qty: 180 tablet, Refills: 1      fish oil-omega-3 fatty acids 300-1,000 mg capsule Take 2 g by mouth once daily.      lisinopril (PRINIVIL,ZESTRIL) 2.5 MG tablet TAKE 1 TABLET DAILY  Qty: 90 tablet, Refills: 1      multivitamin (THERAGRAN) per tablet Take 1 tablet by mouth once daily.             Discharge Procedure Orders (must include Diet, Follow-up, Activity)    Follow Up:  Follow up with PCP as per your routine.  Please follow a high fiber diet.  Activity as tolerated.    No driving day of procedure.

## 2018-05-11 NOTE — DISCHARGE INSTRUCTIONS
Recovery After Procedural Sedation (Adult)  You have been given medicine by vein to make you sleep during your surgery. This may have included both a pain medicine and sleeping medicine. Most of the effects have worn off. But you may still have some drowsiness for the next 6 to 8 hours.  Home care  Follow these guidelines when you get home:  · For the next 8 hours, you should be watched by a responsible adult. This person should make sure your condition is not getting worse.  · Don't drink any alcohol for the next 24 hours.  · Don't drive, operate dangerous machinery, or make important business or personal decisions during the next 24 hours.  Note: Your healthcare provider may tell you not to take any medicine by mouth for pain or sleep in the next 4 hours. These medicines may react with the medicines you were given in the hospital. This could cause a much stronger response than usual.  Follow-up care  Follow up with your healthcare provider if you are not alert and back to your usual level of activity within 12 hours.  When to seek medical advice  Call your healthcare provider right away if any of these occur:  · Drowsiness gets worse  · Weakness or dizziness gets worse  · Repeated vomiting  · You can't be awakened   Date Last Reviewed: 10/18/2016  © 3389-3255 [employer]. [address] [place] [address], [place] [address]. All rights reserved. This information is not intended as a substitute for professional medical care. Always follow your healthcare professional's instructions.        High-Fiber Diet  Fiber is in fruits, vegetables, cereals, and grains. Fiber passes through your body undigested. A high-fiber diet helps food move through your intestinal tract. The added bulk is helpful in preventing constipation. In people with diverticulosis, fiber helps clean out the pouches along the colon wall. It also prevents new pouches from forming. A high-fiber diet reduces the risk of colon cancer. It also lowers  blood cholesterol and prevents high blood sugar in people with diabetes.    The fiber-rich foods listed below should be part of your diet. If you are not used to high-fiber foods, start with 1 or 2 foods from this list. Every 3 to 4 days add a new one to your diet. Do this until you are eating 4 high-fiber foods per day. This should give you 20 to 35 grams of fiber a day. It is also important to drink a lot of water when you are on this diet. You should have 6 to 8 glasses of water a day. Water makes the fiber swell and increases the benefit.  Foods high in dietary fiber  The following foods are high in dietary fiber:  · Breads. Breads made with 100% whole-wheat flour; ravinder, wheat, or rye crackers; whole-grain tortillas, bran muffins.  · Cereals. Whole-grain and bran cereals with bran (shredded wheat, wheat flakes, raisin bran, corn bran); oatmeal, rolled oats, granola, and brown rice.  · Fruits. Fresh fruits and their edible skins (pears, prunes, raisins, berries, apples, and apricots); bananas, citrus fruit, mangoes, pineapple; and prune juice.  · Nuts. Any nuts and seeds.  · Vegetables. Best served raw or lightly cooked. All types, especially: green peas, celery, eggplant, potatoes, spinach, broccoli, [place] sprouts, winter squash, carrots, cauliflower, soybeans, lentils, and fresh and dried beans of all kinds.  · Other. Popcorn, any spices.  Date Last Reviewed: 8/1/2016  © 1181-4122 [employer]. [address] [place] [address], [place] [address]. All rights reserved. This information is not intended as a substitute for professional medical care. Always follow your healthcare professional's instructions.

## 2018-06-06 ENCOUNTER — OFFICE VISIT (OUTPATIENT)
Dept: CARDIOLOGY | Facility: CLINIC | Age: 58
End: 2018-06-06
Payer: OTHER GOVERNMENT

## 2018-06-06 VITALS
SYSTOLIC BLOOD PRESSURE: 136 MMHG | HEIGHT: 71 IN | WEIGHT: 168 LBS | HEART RATE: 60 BPM | DIASTOLIC BLOOD PRESSURE: 83 MMHG | BODY MASS INDEX: 23.52 KG/M2

## 2018-06-06 DIAGNOSIS — E78.5 DYSLIPIDEMIA: ICD-10-CM

## 2018-06-06 DIAGNOSIS — Z95.5 S/P CORONARY ARTERY STENT PLACEMENT: ICD-10-CM

## 2018-06-06 DIAGNOSIS — I25.5 ISCHEMIC CARDIOMYOPATHY: ICD-10-CM

## 2018-06-06 DIAGNOSIS — Z82.49 FAMILY HISTORY OF CORONARY ARTERY DISEASE: ICD-10-CM

## 2018-06-06 DIAGNOSIS — I25.10 CORONARY ARTERY DISEASE INVOLVING NATIVE CORONARY ARTERY OF NATIVE HEART WITHOUT ANGINA PECTORIS: Primary | ICD-10-CM

## 2018-06-06 PROCEDURE — 99999 PR PBB SHADOW E&M-EST. PATIENT-LVL III: CPT | Mod: PBBFAC,,, | Performed by: INTERNAL MEDICINE

## 2018-06-06 PROCEDURE — 99213 OFFICE O/P EST LOW 20 MIN: CPT | Mod: PBBFAC,PO | Performed by: INTERNAL MEDICINE

## 2018-06-06 PROCEDURE — 99214 OFFICE O/P EST MOD 30 MIN: CPT | Mod: S$PBB,,, | Performed by: INTERNAL MEDICINE

## 2018-06-06 NOTE — PROGRESS NOTES
Subjective:    Patient ID:  Dell Motley is a 57 y.o. male who presents for follow-up of Coronary Artery Disease (6 month follow up) and Hyperlipidemia      Mr. Motley reports since last visit is doing well with no new problems or new symptoms. He denies any chest pain, SOB, PND, orthopnea. He denies any palpitations, dizziness, syncope or pre-syncope. He denies rapid or sustained rhythms. He denies claudication, lower extremity edema. He denies exertional symptoms. He continues to vigorously exercise regularly without problem, running up to 7 miles at a time.         Review of Systems   Constitution: Negative for weight gain and weight loss.   HENT: Negative.    Eyes: Negative.    Cardiovascular: Negative for chest pain, claudication, cyanosis, dyspnea on exertion, irregular heartbeat, leg swelling, near-syncope, orthopnea (no PND), palpitations and syncope.   Respiratory: Negative for cough, hemoptysis, shortness of breath and snoring.    Endocrine: Negative.    Skin: Negative.    Musculoskeletal: Negative for joint pain, muscle cramps, muscle weakness and myalgias.   Gastrointestinal: Negative for diarrhea, hematemesis, nausea and vomiting.   Genitourinary: Negative.    Neurological: Negative for dizziness, focal weakness, light-headedness, loss of balance, numbness, paresthesias and seizures.   Psychiatric/Behavioral: Negative.         Objective:    Physical Exam   Constitutional: He is oriented to person, place, and time. He appears well-developed and well-nourished.   HENT:   Mouth/Throat: Oropharynx is clear and moist.   Eyes: Pupils are equal, round, and reactive to light.   Neck: Normal range of motion. No thyromegaly present.   Cardiovascular: Normal rate, regular rhythm, S1 normal, S2 normal, normal heart sounds, intact distal pulses and normal pulses.   No extrasystoles are present. PMI is not displaced.  Exam reveals no friction rub.    No murmur heard.  Pulmonary/Chest: Effort normal and breath  sounds normal. He has no wheezes. He has no rales. He exhibits no tenderness.   Abdominal: Soft. Bowel sounds are normal. He exhibits no distension and no mass. There is no tenderness.   Musculoskeletal: Normal range of motion. He exhibits no edema.   Neurological: He is alert and oriented to person, place, and time.   Skin: Skin is warm and dry.   Vitals reviewed.        Assessment:       1. Coronary artery disease involving native coronary artery of native heart without angina pectoris    2. S/P coronary artery stent placement - x2 Ramus 06/2017; mild luminal disease other vessels    3. Dyslipidemia    4. Family history of coronary artery disease    5. Ischemic cardiomyopathy - EF 45-50%         Plan:       We discussed stopping the brilinta as he is 1 year from the Stent  We discussed periodic stress - he will wait, as he is asymptomatic and has excellent exercise capacity.  F/u in 6 months with repeat Echo

## 2018-06-28 ENCOUNTER — PATIENT MESSAGE (OUTPATIENT)
Dept: FAMILY MEDICINE | Facility: CLINIC | Age: 58
End: 2018-06-28

## 2018-06-28 DIAGNOSIS — S86.001A INJURY OF RIGHT ACHILLES TENDON, INITIAL ENCOUNTER: Primary | ICD-10-CM

## 2018-07-23 ENCOUNTER — PATIENT MESSAGE (OUTPATIENT)
Dept: FAMILY MEDICINE | Facility: CLINIC | Age: 58
End: 2018-07-23

## 2018-07-24 ENCOUNTER — PATIENT MESSAGE (OUTPATIENT)
Dept: FAMILY MEDICINE | Facility: CLINIC | Age: 58
End: 2018-07-24

## 2018-08-03 ENCOUNTER — OFFICE VISIT (OUTPATIENT)
Dept: FAMILY MEDICINE | Facility: CLINIC | Age: 58
End: 2018-08-03
Payer: OTHER GOVERNMENT

## 2018-08-03 VITALS
HEIGHT: 71 IN | TEMPERATURE: 99 F | DIASTOLIC BLOOD PRESSURE: 74 MMHG | HEART RATE: 61 BPM | OXYGEN SATURATION: 99 % | SYSTOLIC BLOOD PRESSURE: 136 MMHG | BODY MASS INDEX: 23.01 KG/M2 | WEIGHT: 164.38 LBS

## 2018-08-03 DIAGNOSIS — B35.1 ONYCHOMYCOSIS: Primary | ICD-10-CM

## 2018-08-03 PROCEDURE — 99213 OFFICE O/P EST LOW 20 MIN: CPT | Mod: S$PBB,,, | Performed by: FAMILY MEDICINE

## 2018-08-03 PROCEDURE — 99999 PR PBB SHADOW E&M-EST. PATIENT-LVL III: CPT | Mod: PBBFAC,,, | Performed by: FAMILY MEDICINE

## 2018-08-03 PROCEDURE — 99213 OFFICE O/P EST LOW 20 MIN: CPT | Mod: PBBFAC,PN | Performed by: FAMILY MEDICINE

## 2018-08-03 RX ORDER — CICLOPIROX 80 MG/ML
SOLUTION TOPICAL NIGHTLY
Qty: 6.6 ML | Refills: 11 | Status: SHIPPED | OUTPATIENT
Start: 2018-08-03 | End: 2019-05-22 | Stop reason: SDUPTHER

## 2018-08-03 NOTE — PROGRESS NOTES
" THIS DOCUMENT WAS MADE IN PART WITH VOICE RECOGNITION SOFTWARE.  OCCASIONALLY THIS SOFTWARE WILL MISINTERPRET WORDS OR PHRASES.      Dell Motley  1960    Subjective     Chief Complaint   Patient presents with    Nail Problem     pt c/o toenail fungus on L first toenail       HPI    Left   1st toe, dark color, painful at times  Home remedies not helping  He did check with his insurance company regarding Jublia, they will only cover this if he has failed other options.  He does not wish to take Lamisil because of potential side effects.  He prefers an alternative topical.      Active Ambulatory Problems     Diagnosis Date Noted    History of colon polyps 05/15/2015    Coronary artery disease involving native coronary artery of native heart without angina pectoris 06/27/2017    S/P coronary artery stent placement - x2 Ramus 06/2017; mild luminal disease other vessels 06/27/2017    Dyslipidemia 06/27/2017    Family history of coronary artery disease 06/27/2017    Hx of colonic polyp 05/11/2018    Ischemic cardiomyopathy - EF 45-50% 06/06/2018     Resolved Ambulatory Problems     Diagnosis Date Noted    No Resolved Ambulatory Problems     Past Medical History:   Diagnosis Date    Coronary artery disease     Hypertension     SNHL (sensorineural hearing loss)          Review of Systems as above    Objective     Physical Exam  Vitals:    08/03/18 0811   BP: 136/74   BP Location: Left arm   Patient Position: Sitting   BP Method: Medium (Manual)   Pulse: 61   Temp: 99.4 °F (37.4 °C)   TempSrc: Oral   SpO2: 99%   Weight: 74.6 kg (164 lb 5.7 oz)   Height: 5' 11" (1.803 m)     His left 1st toenail is thickened and darkly pigmented distally.  The proximal corner or so is fairly clear.  There is some thickening and opacity to the nails on the 2nd and 3rd toe.    MOST RECENT LABS IN OUR ELECTRONIC MEDICAL RECORD:     Results for orders placed or performed in visit on 03/02/18   Lipid panel   Result Value Ref " Range    Cholesterol 104 (L) 120 - 199 mg/dL    Triglycerides 60 30 - 150 mg/dL    HDL 51 40 - 75 mg/dL    LDL Cholesterol 41.0 (L) 63.0 - 159.0 mg/dL    HDL/Chol Ratio 49.0 20.0 - 50.0 %    Total Cholesterol/HDL Ratio 2.0 2.0 - 5.0    Non-HDL Cholesterol 53 mg/dL   ALT (SGPT)   Result Value Ref Range    ALT 41 10 - 44 U/L         Corbernice was seen today for nail problem.    Diagnoses and all orders for this visit:    Onychomycosis    Other orders  -     ciclopirox (PENLAC) 8 % Soln; Apply topically nightly. Apply every evening.  Once a week clean with alcohol and restart apply.     If this does not work we could consider Diflucan (off label) but safer than Lamisil.  Or could consider submitting a prior authorization for Ashlyn with his insurance

## 2018-09-17 ENCOUNTER — PATIENT MESSAGE (OUTPATIENT)
Dept: CARDIOLOGY | Facility: CLINIC | Age: 58
End: 2018-09-17

## 2018-10-30 ENCOUNTER — PATIENT MESSAGE (OUTPATIENT)
Dept: FAMILY MEDICINE | Facility: CLINIC | Age: 58
End: 2018-10-30

## 2018-10-30 ENCOUNTER — PATIENT MESSAGE (OUTPATIENT)
Dept: CARDIOLOGY | Facility: CLINIC | Age: 58
End: 2018-10-30

## 2018-10-30 DIAGNOSIS — I25.10 CORONARY ARTERY DISEASE, ANGINA PRESENCE UNSPECIFIED, UNSPECIFIED VESSEL OR LESION TYPE, UNSPECIFIED WHETHER NATIVE OR TRANSPLANTED HEART: ICD-10-CM

## 2018-10-30 DIAGNOSIS — E78.5 DYSLIPIDEMIA: ICD-10-CM

## 2018-10-30 DIAGNOSIS — Z00.00 ROUTINE GENERAL MEDICAL EXAMINATION AT A HEALTH CARE FACILITY: Primary | ICD-10-CM

## 2018-10-30 RX ORDER — ATORVASTATIN CALCIUM 80 MG/1
TABLET, FILM COATED ORAL
Qty: 90 TABLET | Refills: 1 | Status: SHIPPED | OUTPATIENT
Start: 2018-10-30 | End: 2018-12-13

## 2018-12-11 ENCOUNTER — LAB VISIT (OUTPATIENT)
Dept: LAB | Facility: HOSPITAL | Age: 58
End: 2018-12-11
Attending: FAMILY MEDICINE
Payer: OTHER GOVERNMENT

## 2018-12-11 DIAGNOSIS — E78.5 DYSLIPIDEMIA: ICD-10-CM

## 2018-12-11 DIAGNOSIS — Z00.00 ROUTINE GENERAL MEDICAL EXAMINATION AT A HEALTH CARE FACILITY: ICD-10-CM

## 2018-12-11 LAB
ALBUMIN SERPL BCP-MCNC: 3.8 G/DL
ALP SERPL-CCNC: 61 U/L
ALT SERPL W/O P-5'-P-CCNC: 32 U/L
ANION GAP SERPL CALC-SCNC: 6 MMOL/L
AST SERPL-CCNC: 37 U/L
BILIRUB SERPL-MCNC: 0.8 MG/DL
BUN SERPL-MCNC: 23 MG/DL
CALCIUM SERPL-MCNC: 9 MG/DL
CHLORIDE SERPL-SCNC: 104 MMOL/L
CHOLEST SERPL-MCNC: 120 MG/DL
CHOLEST/HDLC SERPL: 1.8 {RATIO}
CO2 SERPL-SCNC: 29 MMOL/L
CREAT SERPL-MCNC: 1 MG/DL
EST. GFR  (AFRICAN AMERICAN): >60 ML/MIN/1.73 M^2
EST. GFR  (NON AFRICAN AMERICAN): >60 ML/MIN/1.73 M^2
GLUCOSE SERPL-MCNC: 87 MG/DL
HDLC SERPL-MCNC: 68 MG/DL
HDLC SERPL: 56.7 %
LDLC SERPL CALC-MCNC: 43 MG/DL
NONHDLC SERPL-MCNC: 52 MG/DL
POTASSIUM SERPL-SCNC: 4.2 MMOL/L
PROT SERPL-MCNC: 7.2 G/DL
SODIUM SERPL-SCNC: 139 MMOL/L
TRIGL SERPL-MCNC: 45 MG/DL

## 2018-12-11 PROCEDURE — 80053 COMPREHEN METABOLIC PANEL: CPT

## 2018-12-11 PROCEDURE — 80061 LIPID PANEL: CPT

## 2018-12-11 PROCEDURE — 36415 COLL VENOUS BLD VENIPUNCTURE: CPT | Mod: PO

## 2018-12-13 ENCOUNTER — OFFICE VISIT (OUTPATIENT)
Dept: FAMILY MEDICINE | Facility: CLINIC | Age: 58
End: 2018-12-13
Payer: OTHER GOVERNMENT

## 2018-12-13 VITALS
DIASTOLIC BLOOD PRESSURE: 70 MMHG | HEART RATE: 67 BPM | WEIGHT: 163 LBS | RESPIRATION RATE: 16 BRPM | SYSTOLIC BLOOD PRESSURE: 104 MMHG | HEIGHT: 71 IN | TEMPERATURE: 99 F | BODY MASS INDEX: 22.82 KG/M2

## 2018-12-13 DIAGNOSIS — I25.10 CORONARY ARTERY DISEASE, ANGINA PRESENCE UNSPECIFIED, UNSPECIFIED VESSEL OR LESION TYPE, UNSPECIFIED WHETHER NATIVE OR TRANSPLANTED HEART: ICD-10-CM

## 2018-12-13 DIAGNOSIS — E78.5 HYPERLIPIDEMIA, UNSPECIFIED HYPERLIPIDEMIA TYPE: Primary | ICD-10-CM

## 2018-12-13 DIAGNOSIS — E78.5 DYSLIPIDEMIA: ICD-10-CM

## 2018-12-13 DIAGNOSIS — R73.09 ELEVATED GLUCOSE: ICD-10-CM

## 2018-12-13 PROCEDURE — 99214 OFFICE O/P EST MOD 30 MIN: CPT | Mod: S$PBB,,, | Performed by: FAMILY MEDICINE

## 2018-12-13 PROCEDURE — 99999 PR PBB SHADOW E&M-EST. PATIENT-LVL IV: CPT | Mod: PBBFAC,,, | Performed by: FAMILY MEDICINE

## 2018-12-13 PROCEDURE — 99214 OFFICE O/P EST MOD 30 MIN: CPT | Mod: PBBFAC,PN | Performed by: FAMILY MEDICINE

## 2018-12-13 RX ORDER — ATORVASTATIN CALCIUM 40 MG/1
40 TABLET, FILM COATED ORAL DAILY
COMMUNITY
End: 2019-04-22

## 2018-12-13 RX ORDER — FERROUS SULFATE, DRIED 160(50) MG
1 TABLET, EXTENDED RELEASE ORAL 2 TIMES DAILY WITH MEALS
COMMUNITY
End: 2020-02-24

## 2018-12-13 NOTE — PROGRESS NOTES
THIS DOCUMENT WAS MADE IN PART WITH VOICE RECOGNITION SOFTWARE.  OCCASIONALLY THIS SOFTWARE WILL MISINTERPRET WORDS OR PHRASES.      Dell Motley  1960    Dell was seen today for follow-up.    Diagnoses and all orders for this visit:    Hyperlipidemia, unspecified hyperlipidemia type  Stable and well controlled on atorvastatin    Coronary artery disease, angina presence unspecified, unspecified vessel or lesion type, unspecified whether native or transplanted heart  Stable, no angina, he remains very active.  He will follow with Cardiology as well    Elevated glucose  Improved, essentially resolved with his weight loss and dietary changes    He seems well prepared for his trip to hike the entire [facility].  Course he will prepare extensively and take all recommended safety precautions.    Subjective     Chief Complaint   Patient presents with    Follow-up     pt states everyting is doing good        HPI:      Follow-up hyperlipidemia.  He remains on atorvastatin.  His numbers are satisfactory.    He does report that he has been losing weight but he has been eating healthy.  Over the last 7 months he has been between 163 and 167.  It seems to be fairly stable and he is right in the middle of the green zone on the BMI chart.  But I did ask him to monitor it because he should be losing significantly more at this point and will continue a balance diet.    He does inform me that he will be on a long hiking trip starting in February and the plan is not high the [facility] and will be gone about 4 months or so.  He has been active exercising and feels well but he will have a check with his cardiologist as well before this excursion begins.    HPI elements addressed above in the assessment and plan including problems, diagnosis, stability/instability,  improving/worsening, and chronicity will not be duplicated in this section. Any important additional HPI topics will be discussed here if  needed.      Active Ambulatory Problems     Diagnosis Date Noted    History of colon polyps 05/15/2015    Coronary artery disease involving native coronary artery of native heart without angina pectoris 06/27/2017    S/P coronary artery stent placement - x2 Ramus 06/2017; mild luminal disease other vessels 06/27/2017    Dyslipidemia 06/27/2017    Family history of coronary artery disease 06/27/2017    Hx of colonic polyp 05/11/2018    Ischemic cardiomyopathy - EF 45-50% 06/06/2018     Resolved Ambulatory Problems     Diagnosis Date Noted    No Resolved Ambulatory Problems     Past Medical History:   Diagnosis Date    Coronary artery disease     Hypertension     SNHL (sensorineural hearing loss)        Current Outpatient Medications on File Prior to Visit   Medication Sig Dispense Refill    aspirin (ECOTRIN) 81 MG EC tablet Take 81 mg by mouth once daily.        atorvastatin (LIPITOR) 40 MG tablet Take 40 mg by mouth once daily.      calcium-vitamin D3 (CALCIUM 500 + D) 500 mg(1,250mg) -200 unit per tablet Take 1 tablet by mouth 2 (two) times daily with meals.      ciclopirox (PENLAC) 8 % Soln Apply topically nightly. Apply every evening.  Once a week clean with alcohol and restart apply. 6.6 mL 11    fish oil-omega-3 fatty acids 300-1,000 mg capsule Take 2 g by mouth once daily.      lisinopril (PRINIVIL,ZESTRIL) 2.5 MG tablet TAKE 1 TABLET DAILY 90 tablet 1    multivitamin (THERAGRAN) per tablet Take 1 tablet by mouth once daily.      [DISCONTINUED] atorvastatin (LIPITOR) 80 MG tablet TAKE 1 TABLET DAILY (Patient taking differently: 1/2 tablet) 90 tablet 1    [DISCONTINUED] BRILINTA 90 mg tablet TAKE 1 TABLET TWICE A  tablet 1     No current facility-administered medications on file prior to visit.        Review of patient's allergies indicates:   Allergen Reactions    Codeine Nausea And Vomiting       Family History   Problem Relation Age of Onset    Lung cancer Father     Heart  disease Unknown         Remote in second degree relatives       Social History     Tobacco Use    Smoking status: Never Smoker    Smokeless tobacco: Former User    Tobacco comment: Quit 20 years ago.   Substance Use Topics    Alcohol use: Yes     Alcohol/week: 3.0 oz     Types: 5 Glasses of wine per week     Comment: on weekends    Drug use: No         Review of Systems   Constitutional: Negative for activity change and unexpected weight change.   HENT: Negative for hearing loss, rhinorrhea and trouble swallowing.    Eyes: Negative for discharge and visual disturbance.   Respiratory: Negative for chest tightness and wheezing.    Cardiovascular: Negative for chest pain and palpitations.   Gastrointestinal: Negative for blood in stool, constipation, diarrhea and vomiting.   Endocrine: Negative for polydipsia and polyuria.   Genitourinary: Negative for difficulty urinating, hematuria and urgency.   Musculoskeletal: Positive for arthralgias. Negative for joint swelling and neck pain.   Neurological: Negative for weakness and headaches.   Psychiatric/Behavioral: Negative for confusion and dysphoric mood.       Objective     Physical Exam   Constitutional: He is oriented to person, place, and time. He appears well-developed and well-nourished. No distress.   HENT:   Head: Normocephalic and atraumatic.   Right Ear: Tympanic membrane, external ear and ear canal normal.   Left Ear: Tympanic membrane, external ear and ear canal normal.   Nose: Nose normal.   Mouth/Throat: Oropharynx is clear and moist.   Eyes: Conjunctivae and EOM are normal. Pupils are equal, round, and reactive to light. Right eye exhibits no discharge. Left eye exhibits no discharge. No scleral icterus.   Neck: Normal range of motion. Neck supple. No JVD present. No tracheal deviation present. No thyromegaly present.   Cardiovascular: Normal rate, regular rhythm, normal heart sounds and intact distal pulses. Exam reveals no gallop and no friction rub.  "  No murmur heard.  Pulmonary/Chest: Effort normal and breath sounds normal. No stridor. No respiratory distress. He has no wheezes. He has no rales. He exhibits no tenderness.   Abdominal: Soft. Bowel sounds are normal. He exhibits no distension and no mass. There is no tenderness. There is no rebound and no guarding.   Lymphadenopathy:     He has no cervical adenopathy.   Neurological: He is alert and oriented to person, place, and time. He has normal reflexes. He displays normal reflexes. No cranial nerve deficit. He exhibits normal muscle tone. Coordination normal.   Skin: Skin is warm. No rash noted. He is not diaphoretic.   Psychiatric: He has a normal mood and affect. His behavior is normal. Judgment and thought content normal.   Vitals reviewed.      Vitals:    12/13/18 0950   BP: 104/70   BP Location: Right arm   Patient Position: Sitting   BP Method: Large (Manual)   Pulse: 67   Resp: 16   Temp: 98.5 °F (36.9 °C)   TempSrc: Oral   Weight: 73.9 kg (163 lb 0.5 oz)   Height: 5' 11" (1.803 m)       MOST RECENT LABS IN OUR ELECTRONIC MEDICAL RECORD:     Results for orders placed or performed in visit on 12/11/18   Comprehensive metabolic panel   Result Value Ref Range    Sodium 139 136 - 145 mmol/L    Potassium 4.2 3.5 - 5.1 mmol/L    Chloride 104 95 - 110 mmol/L    CO2 29 23 - 29 mmol/L    Glucose 87 70 - 110 mg/dL    BUN, Bld 23 (H) 6 - 20 mg/dL    Creatinine 1.0 0.5 - 1.4 mg/dL    Calcium 9.0 8.7 - 10.5 mg/dL    Total Protein 7.2 6.0 - 8.4 g/dL    Albumin 3.8 3.5 - 5.2 g/dL    Total Bilirubin 0.8 0.1 - 1.0 mg/dL    Alkaline Phosphatase 61 55 - 135 U/L    AST 37 10 - 40 U/L    ALT 32 10 - 44 U/L    Anion Gap 6 (L) 8 - 16 mmol/L    eGFR if African American >60.0 >60 mL/min/1.73 m^2    eGFR if non African American >60.0 >60 mL/min/1.73 m^2   Lipid panel   Result Value Ref Range    Cholesterol 120 120 - 199 mg/dL    Triglycerides 45 30 - 150 mg/dL    HDL 68 40 - 75 mg/dL    LDL Cholesterol 43.0 (L) 63.0 - 159.0 " mg/dL    HDL/Chol Ratio 56.7 (H) 20.0 - 50.0 %    Total Cholesterol/HDL Ratio 1.8 (L) 2.0 - 5.0    Non-HDL Cholesterol 52 mg/dL

## 2018-12-14 ENCOUNTER — OFFICE VISIT (OUTPATIENT)
Dept: CARDIOLOGY | Facility: CLINIC | Age: 58
End: 2018-12-14
Payer: OTHER GOVERNMENT

## 2018-12-14 VITALS
DIASTOLIC BLOOD PRESSURE: 76 MMHG | WEIGHT: 164 LBS | HEIGHT: 71 IN | BODY MASS INDEX: 22.96 KG/M2 | HEART RATE: 76 BPM | SYSTOLIC BLOOD PRESSURE: 118 MMHG

## 2018-12-14 DIAGNOSIS — Z95.5 S/P CORONARY ARTERY STENT PLACEMENT: ICD-10-CM

## 2018-12-14 DIAGNOSIS — I25.10 CORONARY ARTERY DISEASE INVOLVING NATIVE CORONARY ARTERY OF NATIVE HEART WITHOUT ANGINA PECTORIS: Primary | ICD-10-CM

## 2018-12-14 DIAGNOSIS — I25.5 ISCHEMIC CARDIOMYOPATHY: ICD-10-CM

## 2018-12-14 DIAGNOSIS — Z82.49 FAMILY HISTORY OF CORONARY ARTERY DISEASE: ICD-10-CM

## 2018-12-14 PROCEDURE — 99999 PR PBB SHADOW E&M-EST. PATIENT-LVL III: CPT | Mod: PBBFAC,,, | Performed by: INTERNAL MEDICINE

## 2018-12-14 PROCEDURE — 99213 OFFICE O/P EST LOW 20 MIN: CPT | Mod: PBBFAC,PO | Performed by: INTERNAL MEDICINE

## 2018-12-14 PROCEDURE — 99214 OFFICE O/P EST MOD 30 MIN: CPT | Mod: S$PBB,,, | Performed by: INTERNAL MEDICINE

## 2018-12-14 NOTE — PROGRESS NOTES
Subjective:    Patient ID:  Dell Motley is a 58 y.o. male who presents for follow-up of Coronary Artery Disease (follow up); Hyperlipidemia; and Hypertension      Mr. Motley reports since last visit is doing well with no new problems or new symptoms. He denies any chest pain, SOB, PND, orthopnea. He denies any palpitations, dizziness, syncope or pre-syncope. He denies rapid or sustained rhythms. He denies claudication, lower extremity edema. He denies exertional symptoms. He continues to exercise regularly, able to run 4-5 miles without problem. He is getting ready to embark on hiking the [place].         Review of Systems   Constitution: Negative for weight gain and weight loss.   HENT: Negative.    Eyes: Negative.    Cardiovascular: Negative for chest pain, claudication, cyanosis, dyspnea on exertion, irregular heartbeat, leg swelling, near-syncope, orthopnea (no PND), palpitations and syncope.   Respiratory: Negative for cough, hemoptysis, shortness of breath and snoring.    Endocrine: Negative.    Skin: Negative.    Musculoskeletal: Negative for joint pain, muscle cramps, muscle weakness and myalgias.   Gastrointestinal: Negative for diarrhea, hematemesis, nausea and vomiting.   Genitourinary: Negative.    Neurological: Negative for dizziness, focal weakness, light-headedness, loss of balance, numbness, paresthesias and seizures.   Psychiatric/Behavioral: Negative.         Objective:    Physical Exam   Constitutional: He is oriented to person, place, and time. He appears well-developed and well-nourished.   HENT:   Mouth/Throat: Oropharynx is clear and moist.   Eyes: Pupils are equal, round, and reactive to light.   Neck: Normal range of motion. No thyromegaly present.   Cardiovascular: Normal rate, regular rhythm, S1 normal, S2 normal, normal heart sounds, intact distal pulses and normal pulses.  No extrasystoles are present. PMI is not displaced. Exam reveals no friction rub.   No murmur  heard.  Pulmonary/Chest: Effort normal and breath sounds normal. He has no wheezes. He has no rales. He exhibits no tenderness.   Abdominal: Soft. Bowel sounds are normal. He exhibits no distension and no mass. There is no tenderness.   Musculoskeletal: Normal range of motion. He exhibits no edema.   Neurological: He is alert and oriented to person, place, and time.   Skin: Skin is warm and dry.   Vitals reviewed.        Assessment:       1. Coronary artery disease involving native coronary artery of native heart without angina pectoris    2. Ischemic cardiomyopathy - EF 45-50%     3. S/P coronary artery stent placement - x2 Ramus 06/2017; mild luminal disease other vessels    4. Family history of coronary artery disease         Plan:       Pt doing well  Cardiac status stable  We discussed surveillance stress studies  He says he may want to do that at next visit  Continue present Rx  F/u 6-8 months

## 2019-04-22 ENCOUNTER — PATIENT MESSAGE (OUTPATIENT)
Dept: FAMILY MEDICINE | Facility: CLINIC | Age: 59
End: 2019-04-22

## 2019-04-22 RX ORDER — ROSUVASTATIN CALCIUM 10 MG/1
10 TABLET, COATED ORAL DAILY
Qty: 90 TABLET | Refills: 3 | Status: SHIPPED | OUTPATIENT
Start: 2019-04-22 | End: 2020-02-24 | Stop reason: ALTCHOICE

## 2019-05-14 ENCOUNTER — PATIENT MESSAGE (OUTPATIENT)
Dept: FAMILY MEDICINE | Facility: CLINIC | Age: 59
End: 2019-05-14

## 2019-05-22 RX ORDER — CICLOPIROX 80 MG/ML
SOLUTION TOPICAL NIGHTLY
Qty: 6.6 ML | Refills: 3 | Status: SHIPPED | OUTPATIENT
Start: 2019-05-22

## 2019-09-09 ENCOUNTER — PATIENT MESSAGE (OUTPATIENT)
Dept: FAMILY MEDICINE | Facility: CLINIC | Age: 59
End: 2019-09-09

## 2019-09-09 DIAGNOSIS — Z12.83 SKIN EXAM, SCREENING FOR CANCER: Primary | ICD-10-CM

## 2019-09-10 ENCOUNTER — TELEPHONE (OUTPATIENT)
Dept: DERMATOLOGY | Facility: CLINIC | Age: 59
End: 2019-09-10

## 2019-09-13 ENCOUNTER — IMMUNIZATION (OUTPATIENT)
Dept: FAMILY MEDICINE | Facility: CLINIC | Age: 59
End: 2019-09-13
Payer: OTHER GOVERNMENT

## 2019-09-13 PROCEDURE — 90686 IIV4 VACC NO PRSV 0.5 ML IM: CPT | Mod: PBBFAC,PO

## 2019-11-01 ENCOUNTER — PATIENT MESSAGE (OUTPATIENT)
Dept: FAMILY MEDICINE | Facility: CLINIC | Age: 59
End: 2019-11-01

## 2019-11-01 DIAGNOSIS — Z00.00 PREVENTATIVE HEALTH CARE: Primary | ICD-10-CM

## 2019-11-06 ENCOUNTER — OFFICE VISIT (OUTPATIENT)
Dept: DERMATOLOGY | Facility: CLINIC | Age: 59
End: 2019-11-06
Payer: OTHER GOVERNMENT

## 2019-11-06 VITALS — WEIGHT: 164 LBS | HEIGHT: 71 IN | BODY MASS INDEX: 22.96 KG/M2

## 2019-11-06 DIAGNOSIS — Z12.83 SKIN CANCER SCREENING: ICD-10-CM

## 2019-11-06 DIAGNOSIS — L82.1 SEBORRHEIC KERATOSES: ICD-10-CM

## 2019-11-06 DIAGNOSIS — L81.4 SOLAR LENTIGINOSIS: ICD-10-CM

## 2019-11-06 DIAGNOSIS — L57.0 ACTINIC KERATOSES: Primary | ICD-10-CM

## 2019-11-06 PROCEDURE — 99999 PR PBB SHADOW E&M-EST. PATIENT-LVL III: ICD-10-PCS | Mod: PBBFAC,,, | Performed by: DERMATOLOGY

## 2019-11-06 PROCEDURE — 17003 DESTRUCTION, PREMALIGNANT LESIONS; SECOND THROUGH 14 LESIONS: ICD-10-PCS | Mod: S$PBB,,, | Performed by: DERMATOLOGY

## 2019-11-06 PROCEDURE — 99213 OFFICE O/P EST LOW 20 MIN: CPT | Mod: 25,S$PBB,, | Performed by: DERMATOLOGY

## 2019-11-06 PROCEDURE — 17003 DESTRUCT PREMALG LES 2-14: CPT | Mod: PBBFAC,PO | Performed by: DERMATOLOGY

## 2019-11-06 PROCEDURE — 99999 PR PBB SHADOW E&M-EST. PATIENT-LVL III: CPT | Mod: PBBFAC,,, | Performed by: DERMATOLOGY

## 2019-11-06 PROCEDURE — 99213 OFFICE O/P EST LOW 20 MIN: CPT | Mod: PBBFAC,PO | Performed by: DERMATOLOGY

## 2019-11-06 PROCEDURE — 17003 DESTRUCT PREMALG LES 2-14: CPT | Mod: S$PBB,,, | Performed by: DERMATOLOGY

## 2019-11-06 PROCEDURE — 17000 PR DESTRUCTION(LASER SURGERY,CRYOSURGERY,CHEMOSURGERY),PREMALIGNANT LESIONS,FIRST LESION: ICD-10-PCS | Mod: S$PBB,,, | Performed by: DERMATOLOGY

## 2019-11-06 PROCEDURE — 17000 DESTRUCT PREMALG LESION: CPT | Mod: PBBFAC,PO | Performed by: DERMATOLOGY

## 2019-11-06 PROCEDURE — 99213 PR OFFICE/OUTPT VISIT, EST, LEVL III, 20-29 MIN: ICD-10-PCS | Mod: 25,S$PBB,, | Performed by: DERMATOLOGY

## 2019-11-06 PROCEDURE — 17000 DESTRUCT PREMALG LESION: CPT | Mod: S$PBB,,, | Performed by: DERMATOLOGY

## 2019-11-06 NOTE — PROGRESS NOTES
Subjective:       Patient ID:  Dell Motley is a 59 y.o. male who presents for   Chief Complaint   Patient presents with    Skin Check     UBSE    Spot     L dorsal hand, few years, color change in sun, no tx     HPI    Review of Systems     Objective:    Physical Exam   Constitutional: He appears well-developed and well-nourished.   Neurological: He is alert and oriented to person, place, and time.   Psychiatric: He has a normal mood and affect.   Skin:   Areas Examined (abnormalities noted in diagram):   Head / Face Inspection Performed  RUE Inspected  LUE Inspection Performed  Nails and Digits Inspection Performed                       Diagram Legend     Erythematous scaling macule/papule c/w actinic keratosis       Vascular papule c/w angioma      Pigmented verrucoid papule/plaque c/w seborrheic keratosis      Yellow umbilicated papule c/w sebaceous hyperplasia      Irregularly shaped tan macule c/w lentigo     1-2 mm smooth white papules consistent with Milia      Movable subcutaneous cyst with punctum c/w epidermal inclusion cyst      Subcutaneous movable cyst c/w pilar cyst      Firm pink to brown papule c/w dermatofibroma      Pedunculated fleshy papule(s) c/w skin tag(s)      Evenly pigmented macule c/w junctional nevus     Mildly variegated pigmented, slightly irregular-bordered macule c/w mildly atypical nevus      Flesh colored to evenly pigmented papule c/w intradermal nevus       Pink pearly papule/plaque c/w basal cell carcinoma      Erythematous hyperkeratotic cursted plaque c/w SCC      Surgical scar with no sign of skin cancer recurrence      Open and closed comedones      Inflammatory papules and pustules      Verrucoid papule consistent consistent with wart     Erythematous eczematous patches and plaques     Dystrophic onycholytic nail with subungual debris c/w onychomycosis     Umbilicated papule    Erythematous-base heme-crusted tan verrucoid plaque consistent with inflamed seborrheic  keratosis     Erythematous Silvery Scaling Plaque c/w Psoriasis     See annotation      Assessment / Plan:        There are no diagnoses linked to this encounter.         No follow-ups on file.

## 2019-11-06 NOTE — PROGRESS NOTES
Subjective:       Patient ID:  Dell Motley is a 59 y.o. male who presents for   Chief Complaint   Patient presents with    Skin Check     UBSE    Spot     L dorsal hand, few years, color change in sun, no tx     Pt last seen 5-25-17 for removal of skin tag on R axilla    Here today for UBSE and spot to L dorsal hand for few years.  Color changes in sun, becomes darker, no tx.    Hx of AKs tx with cryo  Cyst E&S upper R back, 5-11-17  Pt denies Phx NMSC  Pt denies Fhx MM      Review of Systems   Constitutional: Negative for fever, chills and fatigue.   Skin: Positive for activity-related sunscreen use and wears hat. Negative for daily sunscreen use.   Hematologic/Lymphatic: Does not bruise/bleed easily.        Objective:    Physical Exam   Constitutional: He appears well-developed and well-nourished. No distress.   Neurological: He is alert and oriented to person, place, and time. He is not disoriented.   Psychiatric: He has a normal mood and affect.   Skin:   Areas Examined (abnormalities noted in diagram):   Head / Face Inspection Performed  Neck Inspection Performed  RUE Inspected  LUE Inspection Performed                   Diagram Legend     Erythematous scaling macule/papule c/w actinic keratosis       Vascular papule c/w angioma      Pigmented verrucoid papule/plaque c/w seborrheic keratosis      Yellow umbilicated papule c/w sebaceous hyperplasia      Irregularly shaped tan macule c/w lentigo     1-2 mm smooth white papules consistent with Milia      Movable subcutaneous cyst with punctum c/w epidermal inclusion cyst      Subcutaneous movable cyst c/w pilar cyst      Firm pink to brown papule c/w dermatofibroma      Pedunculated fleshy papule(s) c/w skin tag(s)      Evenly pigmented macule c/w junctional nevus     Mildly variegated pigmented, slightly irregular-bordered macule c/w mildly atypical nevus      Flesh colored to evenly pigmented papule c/w intradermal nevus       Pink pearly papule/plaque  c/w basal cell carcinoma      Erythematous hyperkeratotic cursted plaque c/w SCC      Surgical scar with no sign of skin cancer recurrence      Open and closed comedones      Inflammatory papules and pustules      Verrucoid papule consistent consistent with wart     Erythematous eczematous patches and plaques     Dystrophic onycholytic nail with subungual debris c/w onychomycosis     Umbilicated papule    Erythematous-base heme-crusted tan verrucoid plaque consistent with inflamed seborrheic keratosis     Erythematous Silvery Scaling Plaque c/w Psoriasis     See annotation      Assessment / Plan:        Actinic keratoses  Cryosurgery Procedure Note    Verbal consent from the patient is obtained and the patient is aware of the precancerous quality and need for treatment of these lesions. Liquid nitrogen cryosurgery is applied to the 2 actinic keratoses, as detailed in the physical exam, to produce a freeze injury. The patient is aware that blisters may form and is instructed on wound care with gentle cleansing and use of vaseline ointment to keep moist until healed. The patient is supplied a handout on cryosurgery and is instructed to call if lesions do not completely resolve. Discussed risk postinflammatory pigmentary changes.     Call if nose lesion doesn't resolve, will Rx efudex/calcipotriene- skin medicinals bid x 5 days    Solar lentiginosis, face, arms  This is a benign hyperpigmented sun induced lesion. Daily sun protection will reduce the number of new lesions. Treatment of these benign lesions are considered cosmetic.      Skin cancer screening  Declines complete UBSE  No lesions suspicious for malignancy noted.        Seborrheic keratoses, hand and arm  These are benign inherited growths without a malignant potential. Reassurance given to patient. No treatment is necessary.                Follow up in about 6 months (around 5/6/2020).

## 2019-11-08 ENCOUNTER — LAB VISIT (OUTPATIENT)
Dept: LAB | Facility: HOSPITAL | Age: 59
End: 2019-11-08
Attending: FAMILY MEDICINE
Payer: OTHER GOVERNMENT

## 2019-11-08 DIAGNOSIS — Z00.00 PREVENTATIVE HEALTH CARE: ICD-10-CM

## 2019-11-08 LAB
ALBUMIN SERPL BCP-MCNC: 4 G/DL (ref 3.5–5.2)
ALP SERPL-CCNC: 70 U/L (ref 55–135)
ALT SERPL W/O P-5'-P-CCNC: 23 U/L (ref 10–44)
ANION GAP SERPL CALC-SCNC: 4 MMOL/L (ref 8–16)
AST SERPL-CCNC: 25 U/L (ref 10–40)
BILIRUB SERPL-MCNC: 0.5 MG/DL (ref 0.1–1)
BUN SERPL-MCNC: 24 MG/DL (ref 6–20)
CALCIUM SERPL-MCNC: 9.2 MG/DL (ref 8.7–10.5)
CHLORIDE SERPL-SCNC: 108 MMOL/L (ref 95–110)
CHOLEST SERPL-MCNC: 143 MG/DL (ref 120–199)
CHOLEST/HDLC SERPL: 2 {RATIO} (ref 2–5)
CO2 SERPL-SCNC: 29 MMOL/L (ref 23–29)
COMPLEXED PSA SERPL-MCNC: 0.41 NG/ML (ref 0–4)
CREAT SERPL-MCNC: 1 MG/DL (ref 0.5–1.4)
EST. GFR  (AFRICAN AMERICAN): >60 ML/MIN/1.73 M^2
EST. GFR  (NON AFRICAN AMERICAN): >60 ML/MIN/1.73 M^2
GLUCOSE SERPL-MCNC: 88 MG/DL (ref 70–110)
HDLC SERPL-MCNC: 72 MG/DL (ref 40–75)
HDLC SERPL: 50.3 % (ref 20–50)
LDLC SERPL CALC-MCNC: 60.8 MG/DL (ref 63–159)
NONHDLC SERPL-MCNC: 71 MG/DL
POTASSIUM SERPL-SCNC: 4.6 MMOL/L (ref 3.5–5.1)
PROT SERPL-MCNC: 7.5 G/DL (ref 6–8.4)
SODIUM SERPL-SCNC: 141 MMOL/L (ref 136–145)
TRIGL SERPL-MCNC: 51 MG/DL (ref 30–150)

## 2019-11-08 PROCEDURE — 36415 COLL VENOUS BLD VENIPUNCTURE: CPT | Mod: PO

## 2019-11-08 PROCEDURE — 84153 ASSAY OF PSA TOTAL: CPT

## 2019-11-08 PROCEDURE — 80053 COMPREHEN METABOLIC PANEL: CPT

## 2019-11-08 PROCEDURE — 80061 LIPID PANEL: CPT

## 2020-02-04 ENCOUNTER — PATIENT MESSAGE (OUTPATIENT)
Dept: FAMILY MEDICINE | Facility: CLINIC | Age: 60
End: 2020-02-04

## 2020-02-04 DIAGNOSIS — Z00.00 PREVENTATIVE HEALTH CARE: Primary | ICD-10-CM

## 2020-02-13 ENCOUNTER — LAB VISIT (OUTPATIENT)
Dept: LAB | Facility: HOSPITAL | Age: 60
End: 2020-02-13
Attending: FAMILY MEDICINE
Payer: OTHER GOVERNMENT

## 2020-02-13 DIAGNOSIS — Z00.00 PREVENTATIVE HEALTH CARE: ICD-10-CM

## 2020-02-13 LAB
ALBUMIN SERPL BCP-MCNC: 4 G/DL (ref 3.5–5.2)
ALP SERPL-CCNC: 72 U/L (ref 55–135)
ALT SERPL W/O P-5'-P-CCNC: 31 U/L (ref 10–44)
ANION GAP SERPL CALC-SCNC: 8 MMOL/L (ref 8–16)
AST SERPL-CCNC: 31 U/L (ref 10–40)
BASOPHILS # BLD AUTO: 0.05 K/UL (ref 0–0.2)
BASOPHILS NFR BLD: 0.8 % (ref 0–1.9)
BILIRUB SERPL-MCNC: 0.3 MG/DL (ref 0.1–1)
BUN SERPL-MCNC: 15 MG/DL (ref 6–20)
CALCIUM SERPL-MCNC: 8.7 MG/DL (ref 8.7–10.5)
CHLORIDE SERPL-SCNC: 104 MMOL/L (ref 95–110)
CHOLEST SERPL-MCNC: 121 MG/DL (ref 120–199)
CHOLEST/HDLC SERPL: 1.8 {RATIO} (ref 2–5)
CO2 SERPL-SCNC: 25 MMOL/L (ref 23–29)
CREAT SERPL-MCNC: 1.1 MG/DL (ref 0.5–1.4)
DIFFERENTIAL METHOD: ABNORMAL
EOSINOPHIL # BLD AUTO: 0.4 K/UL (ref 0–0.5)
EOSINOPHIL NFR BLD: 5.7 % (ref 0–8)
ERYTHROCYTE [DISTWIDTH] IN BLOOD BY AUTOMATED COUNT: 12.6 % (ref 11.5–14.5)
EST. GFR  (AFRICAN AMERICAN): >60 ML/MIN/1.73 M^2
EST. GFR  (NON AFRICAN AMERICAN): >60 ML/MIN/1.73 M^2
GLUCOSE SERPL-MCNC: 90 MG/DL (ref 70–110)
HCT VFR BLD AUTO: 45.2 % (ref 40–54)
HDLC SERPL-MCNC: 67 MG/DL (ref 40–75)
HDLC SERPL: 55.4 % (ref 20–50)
HGB BLD-MCNC: 14 G/DL (ref 14–18)
IMM GRANULOCYTES # BLD AUTO: 0.01 K/UL (ref 0–0.04)
IMM GRANULOCYTES NFR BLD AUTO: 0.2 % (ref 0–0.5)
LDLC SERPL CALC-MCNC: 43.2 MG/DL (ref 63–159)
LYMPHOCYTES # BLD AUTO: 1.1 K/UL (ref 1–4.8)
LYMPHOCYTES NFR BLD: 16.7 % (ref 18–48)
MCH RBC QN AUTO: 30.2 PG (ref 27–31)
MCHC RBC AUTO-ENTMCNC: 31 G/DL (ref 32–36)
MCV RBC AUTO: 98 FL (ref 82–98)
MONOCYTES # BLD AUTO: 0.5 K/UL (ref 0.3–1)
MONOCYTES NFR BLD: 8.2 % (ref 4–15)
NEUTROPHILS # BLD AUTO: 4.4 K/UL (ref 1.8–7.7)
NEUTROPHILS NFR BLD: 68.4 % (ref 38–73)
NONHDLC SERPL-MCNC: 54 MG/DL
NRBC BLD-RTO: 0 /100 WBC
PLATELET # BLD AUTO: 209 K/UL (ref 150–350)
PMV BLD AUTO: 11.5 FL (ref 9.2–12.9)
POTASSIUM SERPL-SCNC: 4.4 MMOL/L (ref 3.5–5.1)
PROT SERPL-MCNC: 7.4 G/DL (ref 6–8.4)
RBC # BLD AUTO: 4.63 M/UL (ref 4.6–6.2)
SODIUM SERPL-SCNC: 137 MMOL/L (ref 136–145)
TRIGL SERPL-MCNC: 54 MG/DL (ref 30–150)
TSH SERPL DL<=0.005 MIU/L-ACNC: 3.65 UIU/ML (ref 0.4–4)
WBC # BLD AUTO: 6.46 K/UL (ref 3.9–12.7)

## 2020-02-13 PROCEDURE — 80061 LIPID PANEL: CPT

## 2020-02-13 PROCEDURE — 36415 COLL VENOUS BLD VENIPUNCTURE: CPT | Mod: PO

## 2020-02-13 PROCEDURE — 80053 COMPREHEN METABOLIC PANEL: CPT

## 2020-02-13 PROCEDURE — 85025 COMPLETE CBC W/AUTO DIFF WBC: CPT

## 2020-02-13 PROCEDURE — 84443 ASSAY THYROID STIM HORMONE: CPT

## 2020-02-24 ENCOUNTER — OFFICE VISIT (OUTPATIENT)
Dept: FAMILY MEDICINE | Facility: CLINIC | Age: 60
End: 2020-02-24
Payer: OTHER GOVERNMENT

## 2020-02-24 VITALS
HEIGHT: 71 IN | WEIGHT: 166.13 LBS | BODY MASS INDEX: 23.26 KG/M2 | DIASTOLIC BLOOD PRESSURE: 62 MMHG | SYSTOLIC BLOOD PRESSURE: 108 MMHG | RESPIRATION RATE: 18 BRPM | TEMPERATURE: 99 F | HEART RATE: 52 BPM

## 2020-02-24 DIAGNOSIS — E78.5 HYPERLIPIDEMIA, UNSPECIFIED HYPERLIPIDEMIA TYPE: ICD-10-CM

## 2020-02-24 DIAGNOSIS — I25.10 CORONARY ARTERY DISEASE, ANGINA PRESENCE UNSPECIFIED, UNSPECIFIED VESSEL OR LESION TYPE, UNSPECIFIED WHETHER NATIVE OR TRANSPLANTED HEART: ICD-10-CM

## 2020-02-24 DIAGNOSIS — R73.09 ELEVATED GLUCOSE: ICD-10-CM

## 2020-02-24 DIAGNOSIS — Z00.00 PREVENTATIVE HEALTH CARE: Primary | ICD-10-CM

## 2020-02-24 PROCEDURE — 99999 PR PBB SHADOW E&M-EST. PATIENT-LVL III: ICD-10-PCS | Mod: PBBFAC,,, | Performed by: FAMILY MEDICINE

## 2020-02-24 PROCEDURE — 99999 PR PBB SHADOW E&M-EST. PATIENT-LVL III: CPT | Mod: PBBFAC,,, | Performed by: FAMILY MEDICINE

## 2020-02-24 PROCEDURE — 99213 OFFICE O/P EST LOW 20 MIN: CPT | Mod: PBBFAC,PN | Performed by: FAMILY MEDICINE

## 2020-02-24 PROCEDURE — 99396 PREV VISIT EST AGE 40-64: CPT | Mod: S$PBB,,, | Performed by: FAMILY MEDICINE

## 2020-02-24 PROCEDURE — 99396 PR PREVENTIVE VISIT,EST,40-64: ICD-10-PCS | Mod: S$PBB,,, | Performed by: FAMILY MEDICINE

## 2020-02-24 RX ORDER — ATORVASTATIN CALCIUM 40 MG/1
40 TABLET, FILM COATED ORAL DAILY
COMMUNITY

## 2020-02-24 NOTE — PROGRESS NOTES
THIS DOCUMENT WAS MADE IN PART WITH VOICE RECOGNITION SOFTWARE.  OCCASIONALLY THIS SOFTWARE WILL MISINTERPRET WORDS OR PHRASES.      Dell Motley  1960    Dell was seen today for annual exam.    Diagnoses and all orders for this visit:    Preventative health care  Discussed age-appropriate preventive healthcare.  Discussed the shingles vaccination.  [facility]    Hyperlipidemia, unspecified hyperlipidemia type  Improved, satisfactory.  He tolerates the atorvastatin better than the rosuvastatin apparently    Coronary artery disease, angina presence unspecified, unspecified vessel or lesion type, unspecified whether native or transplanted heart  The stable, no angina, he remains very active exercising vigorously 5 days a week.  I still recommend following with Cardiology per their recommendations.        Subjective     Chief Complaint   Patient presents with    Annual Exam       HPI:    Preventative health care  He returns for a wellness examination.  No significant health changes over the last year.  Discussed and reviewed health maintenance topics and recommendations    Hyperlipidemia, unspecified hyperlipidemia type  Chronic condition currently well controlled on atorvastatin.  Most recent lipid profile improved even further    Coronary artery disease, angina presence unspecified, unspecified vessel or lesion type, unspecified whether native or transplanted heart  Followed by Cardiology.  Reports no active symptoms and certainly no unstable symptoms    Elevated glucose  Remote history of mildly elevated glucose, currently controlled with diet and lifestyle, recent readings have been excellent and in the normal range      Active Ambulatory Problems     Diagnosis Date Noted    History of colon polyps 05/15/2015    Coronary artery disease involving native coronary artery of native heart without angina pectoris 06/27/2017    S/P coronary artery stent placement - x2 Ramus 06/2017;  mild luminal disease other vessels 06/27/2017    Dyslipidemia 06/27/2017    Family history of coronary artery disease 06/27/2017    Hx of colonic polyp 05/11/2018    Ischemic cardiomyopathy - EF 45-50%  06/06/2018     Resolved Ambulatory Problems     Diagnosis Date Noted    No Resolved Ambulatory Problems     Past Medical History:   Diagnosis Date    Coronary artery disease     Hypertension     SNHL (sensorineural hearing loss)        Current Outpatient Medications on File Prior to Visit   Medication Sig Dispense Refill    aspirin (ECOTRIN) 81 MG EC tablet Take 81 mg by mouth once daily.        atorvastatin (LIPITOR) 40 MG tablet Take 40 mg by mouth once daily.      ciclopirox (PENLAC) 8 % Soln Apply topically nightly. Apply every evening.  Once a week clean with alcohol and restart apply. 6.6 mL 3    fish oil-omega-3 fatty acids 300-1,000 mg capsule Take 2 g by mouth once daily.      lisinopril (PRINIVIL,ZESTRIL) 2.5 MG tablet TAKE 1 TABLET DAILY 90 tablet 1    multivitamin (THERAGRAN) per tablet Take 1 tablet by mouth once daily.      [DISCONTINUED] calcium-vitamin D3 (CALCIUM 500 + D) 500 mg(1,250mg) -200 unit per tablet Take 1 tablet by mouth 2 (two) times daily with meals.      [DISCONTINUED] rosuvastatin (CRESTOR) 10 MG tablet Take 1 tablet (10 mg total) by mouth once daily. 90 tablet 3     No current facility-administered medications on file prior to visit.        Review of patient's allergies indicates:   Allergen Reactions    Codeine Nausea And Vomiting       Family History   Problem Relation Age of Onset    Lung cancer Father     Heart disease Unknown         Remote in second degree relatives    Melanoma Neg Hx        Social History     Tobacco Use    Smoking status: Never Smoker    Smokeless tobacco: Former User    Tobacco comment: Quit 20 years ago.   Substance Use Topics    Alcohol use: Yes     Alcohol/week: 5.0 standard drinks     Types: 5 Glasses of wine per week     Frequency:  2-3 times a week     Drinks per session: 1 or 2     Binge frequency: Never     Comment: on weekends    Drug use: No         Review of Systems   Constitutional: Negative for activity change and unexpected weight change.   Respiratory: Negative.  Negative for shortness of breath.    Cardiovascular: Negative.  Negative for chest pain.   Gastrointestinal: Negative.    Musculoskeletal: Positive for arthralgias.   Neurological: Negative.        Objective     Physical Exam   Constitutional: He is oriented to person, place, and time. He appears well-developed and well-nourished. No distress.   HENT:   Head: Normocephalic and atraumatic.   Right Ear: Tympanic membrane, external ear and ear canal normal.   Left Ear: Tympanic membrane, external ear and ear canal normal.   Nose: Nose normal.   Mouth/Throat: Oropharynx is clear and moist.   Eyes: Pupils are equal, round, and reactive to light. Conjunctivae and EOM are normal. Right eye exhibits no discharge. Left eye exhibits no discharge. No scleral icterus.   Neck: Normal range of motion. Neck supple. No tracheal deviation present. No thyromegaly present.   Cardiovascular: Regular rhythm, normal heart sounds and intact distal pulses. Bradycardia present. Exam reveals no gallop and no friction rub.   No murmur heard.  Pulmonary/Chest: Effort normal and breath sounds normal. No stridor. No respiratory distress. He has no wheezes. He has no rales. He exhibits no tenderness.   Abdominal: Soft. Bowel sounds are normal. He exhibits no distension and no mass. There is no tenderness. There is no guarding.   Lymphadenopathy:     He has no cervical adenopathy.   Neurological: He is alert and oriented to person, place, and time. He has normal reflexes. He displays normal reflexes. No cranial nerve deficit. He exhibits normal muscle tone. Coordination normal.   Skin: Skin is warm. No rash noted. He is not diaphoretic.   Psychiatric: He has a normal mood and affect. His behavior is  "normal. Judgment and thought content normal.   Vitals reviewed.      Vitals:    02/24/20 1428   BP: 108/62   BP Location: Left arm   Patient Position: Sitting   BP Method: Medium (Manual)   Pulse: (!) 52   Resp: 18   Temp: 98.9 °F (37.2 °C)   TempSrc: Oral   Weight: 75.4 kg (166 lb 1.9 oz)   Height: 5' 11" (1.803 m)       MOST RECENT LABS IN OUR ELECTRONIC MEDICAL RECORD:     Results for orders placed or performed in visit on 02/13/20   Lipid panel   Result Value Ref Range    Cholesterol 121 120 - 199 mg/dL    Triglycerides 54 30 - 150 mg/dL    HDL 67 40 - 75 mg/dL    LDL Cholesterol 43.2 (L) 63.0 - 159.0 mg/dL    Hdl/Cholesterol Ratio 55.4 (H) 20.0 - 50.0 %    Total Cholesterol/HDL Ratio 1.8 (L) 2.0 - 5.0    Non-HDL Cholesterol 54 mg/dL   Comprehensive metabolic panel   Result Value Ref Range    Sodium 137 136 - 145 mmol/L    Potassium 4.4 3.5 - 5.1 mmol/L    Chloride 104 95 - 110 mmol/L    CO2 25 23 - 29 mmol/L    Glucose 90 70 - 110 mg/dL    BUN, Bld 15 6 - 20 mg/dL    Creatinine 1.1 0.5 - 1.4 mg/dL    Calcium 8.7 8.7 - 10.5 mg/dL    Total Protein 7.4 6.0 - 8.4 g/dL    Albumin 4.0 3.5 - 5.2 g/dL    Total Bilirubin 0.3 0.1 - 1.0 mg/dL    Alkaline Phosphatase 72 55 - 135 U/L    AST 31 10 - 40 U/L    ALT 31 10 - 44 U/L    Anion Gap 8 8 - 16 mmol/L    eGFR if African American >60.0 >60 mL/min/1.73 m^2    eGFR if non African American >60.0 >60 mL/min/1.73 m^2   TSH   Result Value Ref Range    TSH 3.652 0.400 - 4.000 uIU/mL   CBC auto differential   Result Value Ref Range    WBC 6.46 3.90 - 12.70 K/uL    RBC 4.63 4.60 - 6.20 M/uL    Hemoglobin 14.0 14.0 - 18.0 g/dL    Hematocrit 45.2 40.0 - 54.0 %    Mean Corpuscular Volume 98 82 - 98 fL    Mean Corpuscular Hemoglobin 30.2 27.0 - 31.0 pg    Mean Corpuscular Hemoglobin Conc 31.0 (L) 32.0 - 36.0 g/dL    RDW 12.6 11.5 - 14.5 %    Platelets 209 150 - 350 K/uL    MPV 11.5 9.2 - 12.9 fL    Immature Granulocytes 0.2 0.0 - 0.5 %    Gran # (ANC) 4.4 1.8 - 7.7 K/uL    Immature " Grans (Abs) 0.01 0.00 - 0.04 K/uL    Lymph # 1.1 1.0 - 4.8 K/uL    Mono # 0.5 0.3 - 1.0 K/uL    Eos # 0.4 0.0 - 0.5 K/uL    Baso # 0.05 0.00 - 0.20 K/uL    nRBC 0 0 /100 WBC    Gran% 68.4 38.0 - 73.0 %    Lymph% 16.7 (L) 18.0 - 48.0 %    Mono% 8.2 4.0 - 15.0 %    Eosinophil% 5.7 0.0 - 8.0 %    Basophil% 0.8 0.0 - 1.9 %    Differential Method Automated          Age specific and appropriate preventative healthcare discussed/ health maintenance reviewed. Discussed healthy diet and regular exercise. Discussed age-appropriate preventative screening tests and recommendations. Discussed recommended follow-up based on age and conditions.

## 2020-04-02 ENCOUNTER — PATIENT MESSAGE (OUTPATIENT)
Dept: FAMILY MEDICINE | Facility: CLINIC | Age: 60
End: 2020-04-02

## 2020-05-05 ENCOUNTER — PATIENT MESSAGE (OUTPATIENT)
Dept: FAMILY MEDICINE | Facility: CLINIC | Age: 60
End: 2020-05-05

## 2020-05-05 NOTE — TELEPHONE ENCOUNTER
Order entered.  I think it will work although he should verify that his insurance covers this.  That is my biggest concern.  If Magdiel does not cover at a pharmacy he could get sent a rather large bill.

## 2021-04-06 ENCOUNTER — PATIENT MESSAGE (OUTPATIENT)
Dept: ADMINISTRATIVE | Facility: HOSPITAL | Age: 61
End: 2021-04-06

## 2021-07-07 ENCOUNTER — PATIENT MESSAGE (OUTPATIENT)
Dept: ADMINISTRATIVE | Facility: HOSPITAL | Age: 61
End: 2021-07-07

## 2022-08-07 ENCOUNTER — PATIENT MESSAGE (OUTPATIENT)
Dept: GASTROENTEROLOGY | Facility: CLINIC | Age: 62
End: 2022-08-07
Payer: OTHER GOVERNMENT

## 2022-08-07 ENCOUNTER — PATIENT MESSAGE (OUTPATIENT)
Dept: ADMINISTRATIVE | Facility: OTHER | Age: 62
End: 2022-08-07
Payer: OTHER GOVERNMENT